# Patient Record
Sex: MALE | Race: WHITE | NOT HISPANIC OR LATINO | Employment: OTHER | URBAN - METROPOLITAN AREA
[De-identification: names, ages, dates, MRNs, and addresses within clinical notes are randomized per-mention and may not be internally consistent; named-entity substitution may affect disease eponyms.]

---

## 2017-06-07 ENCOUNTER — OFFICE VISIT (OUTPATIENT)
Dept: OTOLARYNGOLOGY | Facility: CLINIC | Age: 82
End: 2017-06-07
Payer: MEDICARE

## 2017-06-07 ENCOUNTER — TELEPHONE (OUTPATIENT)
Dept: OTOLARYNGOLOGY | Facility: CLINIC | Age: 82
End: 2017-06-07

## 2017-06-07 VITALS
HEIGHT: 68 IN | HEART RATE: 54 BPM | SYSTOLIC BLOOD PRESSURE: 146 MMHG | BODY MASS INDEX: 22.34 KG/M2 | DIASTOLIC BLOOD PRESSURE: 68 MMHG | WEIGHT: 147.38 LBS | TEMPERATURE: 98 F

## 2017-06-07 DIAGNOSIS — H60.322: ICD-10-CM

## 2017-06-07 DIAGNOSIS — H90.3 SENSORINEURAL HEARING LOSS (SNHL), BILATERAL: ICD-10-CM

## 2017-06-07 DIAGNOSIS — H61.22 IMPACTED CERUMEN OF LEFT EAR: Primary | ICD-10-CM

## 2017-06-07 PROCEDURE — 1159F MED LIST DOCD IN RCRD: CPT | Mod: ,,, | Performed by: OTOLARYNGOLOGY

## 2017-06-07 PROCEDURE — 1126F AMNT PAIN NOTED NONE PRSNT: CPT | Mod: ,,, | Performed by: OTOLARYNGOLOGY

## 2017-06-07 PROCEDURE — 99213 OFFICE O/P EST LOW 20 MIN: CPT | Mod: PBBFAC | Performed by: OTOLARYNGOLOGY

## 2017-06-07 PROCEDURE — 69210 REMOVE IMPACTED EAR WAX UNI: CPT | Mod: S$PBB,,, | Performed by: OTOLARYNGOLOGY

## 2017-06-07 PROCEDURE — 99214 OFFICE O/P EST MOD 30 MIN: CPT | Mod: 25,S$PBB,, | Performed by: OTOLARYNGOLOGY

## 2017-06-07 PROCEDURE — 99999 PR PBB SHADOW E&M-EST. PATIENT-LVL III: CPT | Mod: PBBFAC,,, | Performed by: OTOLARYNGOLOGY

## 2017-06-07 RX ORDER — OFLOXACIN 3 MG/ML
3 SOLUTION AURICULAR (OTIC) 2 TIMES DAILY
Qty: 5 ML | Refills: 0 | Status: SHIPPED | OUTPATIENT
Start: 2017-06-07 | End: 2017-06-17

## 2017-06-07 NOTE — TELEPHONE ENCOUNTER
----- Message from Mira Stack sent at 6/7/2017  3:29 PM CDT -----  Contact: Mira - [facility]  STAT appt request  Dr RABIA MEDINA/[facility] is requesting STAT  appt     [facility] are/cleaning     pts ear is still plugged and now bleeding.     Can you plz see pt today?    Thank you,  Mira Stack   [facility]    [id number]

## 2017-06-07 NOTE — PROCEDURES
Ear Cerumen Removal  Date/Time: 6/7/2017 5:25 PM  Performed by: DANITZA CLARK  Authorized by: DANITZA CLARK     Consent Done?:  Yes (Verbal)    Procedure Note    Diagnosis: left Cerumen Impaction    Description:  The patient was seated in an exam chair.  An ear speculum was placed in the left  EAC and was examined under the microscope.  Suction and/or loop curettes were used to remove a large cerumen impaction.  The tympanic membrane was visualized and was normal in appearance.  The patient tolerated the procedure well without complications.

## 2017-06-07 NOTE — PROGRESS NOTES
Chief Complaint   Patient presents with    Cerumen Impaction     bilateral     Other     [facility] him to be seen by an ENT   .     HPI:  Mr. Jennings was referred today from urgent care for evaluation of bloody otorrhea and possible cerumen impaction. Earlier today he woke up and could not hear out of his left ear which is his only hearing ear with amplification.  [facility] Multiple attempts at irrigating and removing the cerumen was unsuccessful.  The patient left and felt he still could not hear. He later noted bloody otorrhea and went to urgent care. The MD at urgent care could not adequately evaluate the ear due to the bloody otorrhea so he was sent for ENT eval. He notes decreased hearing, left ear pain, and bloody otorrhea. It it the same since onset some hours ago. No interventions thus far have been helpful. Urgent care placed ear drops in attempt to lessen the cerumen and bleeding.     Past Medical History:   Diagnosis Date    Cancer     face    Hearing loss      Social History     Social History    Marital status:      Spouse name: N/A    Number of children: N/A    Years of education: N/A     Occupational History    Not on file.     Social History Main Topics    Smoking status: Never Smoker    Smokeless tobacco: Never Used    Alcohol use No    Drug use: No    Sexual activity: Not on file     Other Topics Concern    Not on file     Social History Narrative    No narrative on file     Past Surgical History:   Procedure Laterality Date    MULTIPLE TOOTH EXTRACTIONS      only sedation in lefe     Family History   Problem Relation Age of Onset    Diabetes Neg Hx     Hypertension Neg Hx     Thyroid disease Neg Hx            Review of Systems  General: negative for chills, fever or weight loss  Psychological: negative for mood changes or depression  Ophthalmic: negative for blurry vision, photophobia or eye pain  ENT: see HPI  Respiratory  ROS: no cough, shortness of breath, or wheezing  Cardiovascular: no chest pain or dyspnea on exertion  Gastrointestinal ROS: no abdominal pain, change in bowel habits, or black/ bloody stools  Musculoskeletal ROS: negative for gait disturbance or muscular weakness  Neurological: no syncope or seizures; no ataxia  Dermatological: negative for puritis,  rash and jaundice  Hematologic/lymphatic: no easy bruising, no new lumps or bumps      Physical Exam:    Vitals:    06/07/17 1631   BP: (!) 146/68   Pulse:    Temp:        Constitutional: Well appearing / communicating without difficutly.  NAD.  Eyes: EOM I Bilaterally  Head/Face: Normocephalic.  Negative paranasal sinus pressure/tenderness.  Salivary glands WNL.  House Brackmann I Bilaterally.    Right Ear: External ear normal and ear canal normal. Hearing decreased to conversation.   Left Ear: External ear normal and ear canal normal occluded. Decreased hearing is noted.   Left complete cerumen impaction, removal described in a separate procedure note.    Nose: No gross nasal septal deviation. Inferior Turbinates +2 bilaterally. No septal perforation. No masses/lesions. External nasal skin appears normal without masses/lesions.  Oral Cavity: Gingiva/lips within normal limits.  Dentition/gingiva healthy appearing. Mucus membranes moist. Floor of mouth soft, no masses palpated. Oral Tongue mobile. Hard Palate appears normal.    Oropharynx: Base of tongue appears normal. No masses/lesions noted. Tonsillar fossa/pharyngeal wall without lesions. Posterior oropharynx WNL.  Soft palate without masses. Midline uvula.   Neck/Lymphatic: No LAD I-VI bilaterally.  No thyromegaly.  No masses noted on exam.    Mirror laryngoscopy/nasopharyngoscopy: Active gag reflex.  Unable to perform.    Neuro/Psychiatric: AOx3.  Normal mood and affect.   Cardiovascular: Normal carotid pulses bilaterally, no increasing jugular venous distention noted at cervical region bilaterally.     Respiratory: Normal respiratory effort, no stridor, no retractions noted.            Assessment:    ICD-10-CM ICD-9-CM    1. Impacted cerumen of left ear H61.22 380.4    2. Hemorrhagic otitis externa of left ear, unspecified chronicity H60.322 380.10    3. Sensorineural hearing loss (SNHL), bilateral H90.3 389.18      The primary encounter diagnosis was Impacted cerumen of left ear. Diagnoses of Hemorrhagic otitis externa of left ear, unspecified chronicity and Sensorineural hearing loss (SNHL), bilateral were also pertinent to this visit.      Plan:  No orders of the defined types were placed in this encounter.      1. Otitis externa: Ofloxacin drops to left ear BID for 10 days. Keep ear dry.  2.   Cerumen impaction:  Removed today without difficulty.  I would recommend the use of a wax softening drop, either over the counter Debrox or mineral oil, on a weekly basis.  I also instructed the patient to avoid Qtips  3. Bilateral SNHL: recommend annual audiograms and continued hearing aid.         Kayla Nowak MD

## 2017-06-07 NOTE — TELEPHONE ENCOUNTER
----- Message from Mira Stack sent at 6/7/2017  3:29 PM CDT -----  Contact: Mira - [facility]  STAT appt request  Dr RABIA MEDINA/[facility] is requesting STAT  appt     [facility]/cleaning     pts ear is still plugged and now bleeding.     Can you plz see pt today?    Thank you,  Mira Stack   [facility]    [id number]

## 2017-06-07 NOTE — PROGRESS NOTES
"  Chief Complaint   Patient presents with    Cerumen Impaction     bilateral     Other     [facility] referred him to be seen by an ENT   .     HPI: Thomas Jennings is 89 y.o. male who is{Referred by:39660} for evaluation of {left/right/bi:02804} ear pain.  Symptoms include {otitis symptoms:327}. Symptoms began {numbers; 0-10:72198} {unit:11} ago and are {course:17::"unchanged"} since that time. Patient denies {respiratory symptoms:98072}. Ear history: {numbers; 0-10:14874} previous ear infections. {HE SHE CAPITAL LETTER:80238} {Actions; denies/reports/admits to:54465} to hearing loss.           Past Medical History:   Diagnosis Date    Cancer     face    Hearing loss      Social History     Social History    Marital status:      Spouse name: N/A    Number of children: N/A    Years of education: N/A     Occupational History    Not on file.     Social History Main Topics    Smoking status: Never Smoker    Smokeless tobacco: Never Used    Alcohol use No    Drug use: No    Sexual activity: Not on file     Other Topics Concern    Not on file     Social History Narrative    No narrative on file     Past Surgical History:   Procedure Laterality Date    MULTIPLE TOOTH EXTRACTIONS      only sedation in lefe     Family History   Problem Relation Age of Onset    Diabetes Neg Hx     Hypertension Neg Hx     Thyroid disease Neg Hx            Review of Systems  General: negative for chills, fever or weight loss  Psychological: negative for mood changes or depression  Ophthalmic: negative for blurry vision, photophobia or eye pain  ENT: see HPI  Respiratory: no cough, shortness of breath, or wheezing  Cardiovascular: no chest pain or dyspnea on exertion  Gastrointestinal: no abdominal pain, change in bowel habits, or black/ bloody stools  Musculoskeletal: negative for gait disturbance or muscular weakness  Neurological: no syncope or seizures; no ataxia  Dermatological: negative " for puritis,  rash and jaundice  Hematologic/lymphatic: no easy bruising, no new lumps or bumps      Physical Exam:    Vitals:    06/07/17 1631   BP: (!) 146/68   Pulse:    Temp:        Constitutional: Well appearing / communicating without difficutly.  NAD.  Eyes: EOM I Bilaterally  Head/Face: Normocephalic.  Negative paranasal sinus pressure/tenderness.  Salivary glands WNL.  House Brackmann I Bilaterally.    Right Ear: Auricle normal appearance. External Auditory Canal within normal limits no lesions or masses,TM w/o masses/lesions/perforations. TM mobility noted.   Left Ear: Auricle normal appearance. External Auditory Canal within normal limits no lesions or masses,TM w/o masses/lesions/perforations. TM mobility noted.  Nose: ***No gross nasal septal deviation. Inferior Turbinates ***3+ bilaterally. No septal perforation. No masses/lesions. External nasal skin appears normal without masses/lesions.  Oral Cavity: Gingiva/lips within normal limits.  Dentition/gingiva healthy appearing. Mucus membranes moist. Floor of mouth soft, no masses palpated. Oral Tongue mobile. Hard Palate appears normal.    Oropharynx: Base of tongue appears normal. No masses/lesions noted. Tonsillar fossa/pharyngeal wall without lesions. Posterior oropharynx WNL.  Soft palate without masses. Midline uvula.   Neck/Lymphatic: No LAD I-VI bilaterally.  No thyromegaly.  No masses noted on exam.    Mirror laryngoscopy/nasopharyngoscopy: Active gag reflex.  Unable to perform.    Neuro/Psychiatric: AOx3.  Normal mood and affect.   Cardiovascular: Normal carotid pulses bilaterally, no increasing jugular venous distention noted at cervical region bilaterally.    Respiratory: Normal respiratory effort, no stridor, no retractions noted.            Assessment:  No diagnosis found.  There were no encounter diagnoses.      Plan:  No orders of the defined types were placed in this encounter.          Kayla Nowak MD

## 2017-06-14 ENCOUNTER — OFFICE VISIT (OUTPATIENT)
Dept: OTOLARYNGOLOGY | Facility: CLINIC | Age: 82
End: 2017-06-14
Payer: MEDICARE

## 2017-06-14 ENCOUNTER — APPOINTMENT (OUTPATIENT)
Dept: LAB | Facility: HOSPITAL | Age: 82
End: 2017-06-14
Attending: OTOLARYNGOLOGY
Payer: MEDICARE

## 2017-06-14 VITALS
HEART RATE: 58 BPM | DIASTOLIC BLOOD PRESSURE: 62 MMHG | SYSTOLIC BLOOD PRESSURE: 115 MMHG | WEIGHT: 143.63 LBS | HEIGHT: 68 IN | BODY MASS INDEX: 21.77 KG/M2 | TEMPERATURE: 98 F

## 2017-06-14 DIAGNOSIS — H90.3 SENSORINEURAL HEARING LOSS (SNHL), BILATERAL: ICD-10-CM

## 2017-06-14 DIAGNOSIS — H60.322: Primary | ICD-10-CM

## 2017-06-14 DIAGNOSIS — H93.90 EAR LESION: ICD-10-CM

## 2017-06-14 PROCEDURE — 99213 OFFICE O/P EST LOW 20 MIN: CPT | Mod: 25,S$PBB,, | Performed by: OTOLARYNGOLOGY

## 2017-06-14 PROCEDURE — 11100 PR BIOPSY OF SKIN LESION: CPT | Mod: S$PBB,,, | Performed by: OTOLARYNGOLOGY

## 2017-06-14 PROCEDURE — 99999 PR PBB SHADOW E&M-EST. PATIENT-LVL III: CPT | Mod: PBBFAC,,, | Performed by: OTOLARYNGOLOGY

## 2017-06-14 PROCEDURE — 88305 TISSUE EXAM BY PATHOLOGIST: CPT | Performed by: PATHOLOGY

## 2017-06-14 PROCEDURE — 11100 PR BIOPSY OF SKIN LESION: CPT | Mod: PBBFAC | Performed by: OTOLARYNGOLOGY

## 2017-06-14 PROCEDURE — 88305 TISSUE EXAM BY PATHOLOGIST: CPT | Mod: 26,,, | Performed by: PATHOLOGY

## 2017-06-14 PROCEDURE — 1126F AMNT PAIN NOTED NONE PRSNT: CPT | Mod: S$PBB,,, | Performed by: OTOLARYNGOLOGY

## 2017-06-14 PROCEDURE — 99213 OFFICE O/P EST LOW 20 MIN: CPT | Mod: PBBFAC | Performed by: OTOLARYNGOLOGY

## 2017-06-14 PROCEDURE — 1159F MED LIST DOCD IN RCRD: CPT | Mod: S$PBB,,, | Performed by: OTOLARYNGOLOGY

## 2017-06-14 NOTE — PROGRESS NOTES
Chief Complaint   Patient presents with    Follow-up   .     HPI:  Mr. Jennings was referred today from urgent care for evaluation of bloody otorrhea and possible cerumen impaction. Earlier today he woke up and could not hear out of his left ear which is his only hearing ear with amplification.  [facility] Multiple attempts at irrigating and removing the cerumen was unsuccessful.  The patient left and felt he still could not hear. He later noted bloody otorrhea and went to urgent care. The MD at urgent care could not adequately evaluate the ear due to the bloody otorrhea so he was sent for ENT eval. He notes decreased hearing, left ear pain, and bloody otorrhea. It it the same since onset some hours ago. No interventions thus far have been helpful. Urgent care placed ear drops in attempt to lessen the cerumen and bleeding.     Interval history: Mr. Jennings reports that his ear is improved. He has no further bloody otorrhea or otalgia.  He feels the ear drops have helped. He is able to use his hearing aids now and feels his hearing is at his baseline. He reports a chronically bleeding sore on his left ear that has been bothering him. He has a history of prior WLE of BCCA in this region 2 years ago.       Past Medical History:   Diagnosis Date    Cancer     face    Hearing loss      Social History     Social History    Marital status:      Spouse name: N/A    Number of children: N/A    Years of education: N/A     Occupational History    Not on file.     Social History Main Topics    Smoking status: Never Smoker    Smokeless tobacco: Never Used    Alcohol use No    Drug use: No    Sexual activity: Not on file     Other Topics Concern    Not on file     Social History Narrative    No narrative on file     Past Surgical History:   Procedure Laterality Date    MULTIPLE TOOTH EXTRACTIONS      only sedation in lefe     Family History   Problem Relation Age of Onset    Diabetes Neg Hx      Hypertension Neg Hx     Thyroid disease Neg Hx            Review of Systems  General: negative for chills, fever or weight loss  Psychological: negative for mood changes or depression  Ophthalmic: negative for blurry vision, photophobia or eye pain  ENT: see HPI  Respiratory ROS: no cough, shortness of breath, or wheezing  Cardiovascular: no chest pain or dyspnea on exertion  Gastrointestinal ROS: no abdominal pain, change in bowel habits, or black/ bloody stools  Musculoskeletal ROS: negative for gait disturbance or muscular weakness  Neurological: no syncope or seizures; no ataxia  Dermatological: negative for puritis,  rash and jaundice  Hematologic/lymphatic: no easy bruising, no new lumps or bumps      Physical Exam:    Vitals:    06/14/17 1453   BP: 115/62   Pulse: (!) 58   Temp: 98.1 °F (36.7 °C)       Constitutional: Well appearing / communicating without difficutly.  NAD.  Eyes: EOM I Bilaterally  Head/Face: Normocephalic.  Negative paranasal sinus pressure/tenderness.  Salivary glands WNL.  House Brackmann I Bilaterally.    Right Ear: External ear normal and ear canal normal. Hearing decreased to conversation. TM intact; no effusion; normal mobility.  Left Ear: Ear canal improved. Decreased hearing is noted TM intact; no effusion; normal mobility. Ulcerative lesion of the external auricle which is ulcerative  erythematous immediately superior to the tragus.      Nose: No gross nasal septal deviation. Inferior Turbinates +2 bilaterally. No septal perforation. No masses/lesions. External nasal skin appears normal without masses/lesions.  Oral Cavity: Gingiva/lips within normal limits.  Dentition/gingiva healthy appearing. Mucus membranes moist. Floor of mouth soft, no masses palpated. Oral Tongue mobile. Hard Palate appears normal.    Oropharynx: Base of tongue appears normal. No masses/lesions noted. Tonsillar fossa/pharyngeal wall without lesions. Posterior oropharynx WNL.  Soft palate without masses.  Midline uvula.   Neck/Lymphatic: No LAD I-VI bilaterally.  No thyromegaly.  No masses noted on exam.    Mirror laryngoscopy/nasopharyngoscopy: Active gag reflex.  Unable to perform.    Neuro/Psychiatric: AOx3.  Normal mood and affect.   Cardiovascular: Normal carotid pulses bilaterally, no increasing jugular venous distention noted at cervical region bilaterally.    Respiratory: Normal respiratory effort, no stridor, no retractions noted.      See separate procedure note      Assessment:    ICD-10-CM ICD-9-CM    1. Hemorrhagic otitis externa of left ear, unspecified chronicity H60.322 380.10    2. Ear lesion H61.899 380.89 Tissue Specimen To Pathology, Ent   3. Sensorineural hearing loss (SNHL), bilateral H90.3 389.18      The primary encounter diagnosis was Hemorrhagic otitis externa of left ear, unspecified chronicity. Diagnoses of Ear lesion and Sensorineural hearing loss (SNHL), bilateral were also pertinent to this visit.      Plan:    1. Otitis externa: Resolved.  2. Bilateral SNHL: recommend annual audiograms and continued use of hearing aids.   3. Left auricular lesion: Punch biopsy performed today. Further recommendations to follow when pathology results are available.         Kayla Nowak MD

## 2017-06-19 NOTE — PROCEDURES
Procedures     PROCEDURE NOTE:  Punch biopsy of Left auricular skin lesion  Preprocedure diagnosis:  Left auricular lesion  Postprocedure diangosis:  Same  Complications:  None  Blood Loss:  Minimal    Procedure in detail:  After written consent was obtained, approximately 1cc of 1% lidocaine with epinepherine was injected into the area of the lesion.  Using a 3mm punch biopsy, a tissue specimen was obtained from the border of the lesion and placed in a sterile container for pathologic analysis.  A small amount of ointment was applied to the area.  The patient tolerated the procedure well and there were no significant complications.

## 2017-06-27 ENCOUNTER — TELEPHONE (OUTPATIENT)
Dept: OTOLARYNGOLOGY | Facility: CLINIC | Age: 82
End: 2017-06-27

## 2017-06-27 NOTE — TELEPHONE ENCOUNTER
Please call the patient or patient's daughter to inform her that the biopsy of the ear was consistent with the type of cancer he had previously in that area- basal cell carcinoma. We will have to schedule him for removal in the OR.

## 2017-06-28 NOTE — TELEPHONE ENCOUNTER
Spoke with patient, notified that Dr Pool would have to remove the carcinoma on his ear. Told patient he would need to get cleared by his PCP first before surgery could be performed. Patient reports he does not have a PCP but he will find one to get the surgery clearance. Told patient to call back once he found one and to call if he had any more questions, pt verbalized understanding.

## 2017-07-18 ENCOUNTER — TELEPHONE (OUTPATIENT)
Dept: OTOLARYNGOLOGY | Facility: CLINIC | Age: 82
End: 2017-07-18

## 2017-07-18 NOTE — TELEPHONE ENCOUNTER
----- Message from Kayla Nowak MD sent at 7/18/2017 10:40 AM CDT -----  Natalie,  Can you please call this patient back and also bring his daughter into the conversation? He has a skin cancer on his ear that needs to be removed. I don't want this patient to get lost to follow up and end up with a bad outcome. I think his daughter helps coordinate his care.   Thanks,  MT

## 2017-07-18 NOTE — TELEPHONE ENCOUNTER
Attempted to call patient and his daughter to discuss scheduling a day for surgery. Left voicemail to return my call

## 2017-07-18 NOTE — TELEPHONE ENCOUNTER
Attempted to call Alma Rosa to discuss patient results and to talk about scheduling him in the OR to remove carcinoma on the left ear. No answer, left voicemail to return my call

## 2017-07-21 ENCOUNTER — TELEPHONE (OUTPATIENT)
Dept: OTOLARYNGOLOGY | Facility: CLINIC | Age: 82
End: 2017-07-21

## 2017-07-21 NOTE — TELEPHONE ENCOUNTER
Attempted to call Al[facility] Rosa to discuss scheduling Thomas in the OR. No answer, left voicemail to return my call.

## 2017-09-06 ENCOUNTER — OFFICE VISIT (OUTPATIENT)
Dept: OTOLARYNGOLOGY | Facility: CLINIC | Age: 82
End: 2017-09-06
Payer: MEDICARE

## 2017-09-06 VITALS
WEIGHT: 142.19 LBS | BODY MASS INDEX: 21.55 KG/M2 | HEIGHT: 68 IN | HEART RATE: 68 BPM | DIASTOLIC BLOOD PRESSURE: 70 MMHG | SYSTOLIC BLOOD PRESSURE: 140 MMHG

## 2017-09-06 DIAGNOSIS — C44.219 BASAL CELL CARCINOMA, EAR, LEFT: Primary | ICD-10-CM

## 2017-09-06 DIAGNOSIS — D49.0: ICD-10-CM

## 2017-09-06 PROCEDURE — 1126F AMNT PAIN NOTED NONE PRSNT: CPT | Mod: ,,, | Performed by: OTOLARYNGOLOGY

## 2017-09-06 PROCEDURE — 99215 OFFICE O/P EST HI 40 MIN: CPT | Mod: PBBFAC,PO | Performed by: OTOLARYNGOLOGY

## 2017-09-06 PROCEDURE — 11100 PR BIOPSY OF SKIN LESION: CPT | Mod: PBBFAC,PO | Performed by: OTOLARYNGOLOGY

## 2017-09-06 PROCEDURE — 88305 TISSUE EXAM BY PATHOLOGIST: CPT | Performed by: PATHOLOGY

## 2017-09-06 PROCEDURE — 1159F MED LIST DOCD IN RCRD: CPT | Mod: ,,, | Performed by: OTOLARYNGOLOGY

## 2017-09-06 PROCEDURE — 99214 OFFICE O/P EST MOD 30 MIN: CPT | Mod: 25,S$PBB,, | Performed by: OTOLARYNGOLOGY

## 2017-09-06 PROCEDURE — 99999 PR PBB SHADOW E&M-EST. PATIENT-LVL V: CPT | Mod: PBBFAC,,, | Performed by: OTOLARYNGOLOGY

## 2017-09-06 PROCEDURE — 11100 PR BIOPSY OF SKIN LESION: CPT | Mod: S$PBB,,, | Performed by: OTOLARYNGOLOGY

## 2017-09-06 NOTE — PROGRESS NOTES
Subjective:       Patient ID: Thomas Jennings is a 89 y.o. male.    Chief Complaint: Basal Cell Carcinoma (left ear)    Thomas is here for follow-up of history of left BCCa. He has a history of left ear BCCa removed a few years ago. Margins ultimately negative, local tissue closure. Reports recurrence of his lesion 4-5 months ago on junction of helical root and tragus.  It occasionally bleeds.  This was biopsied as recurrent basal cell carcinoma.  He also has a enlarging lesion over the past year inferior to his left nasal labial fold.  This has not been biopsied.  Other than his age he has no medical issues and takes no medications.  Denies any bleeding disorders. Does not wear glasses.    Review of Systems   Constitutional: Negative for activity change and appetite change.   Respiratory: Negative for difficulty breathing and wheezing   Cardiovascular: Negative for chest pain.      Objective:        Constitutional:   Vital signs are normal. He appears well-developed and well-nourished.     Head:  Normocephalic and atraumatic.     Ears:    Right Ear: No swelling. Tympanic membrane mobility is normal. No decreased hearing is noted.   Left Ear: No swelling. Tympanic membrane mobility is normal. No decreased hearing is noted.   1.2 cm ulcerative lesion at helical root and superior tragus with some extension to the cymba angel luis.         Nose:  No mucosal edema or rhinorrhea.                     Tests / Results:  I personally reviewed the biopsy performed of the left helix and my findings reveal: Recurrent basal cell carcinoma present at the inked margins    Thomas Jennings  [id number]  7/30/1922    Diagnosis: Lesion of left nasolabial fold, upper lip    Procedure: Punch biopsy of left upper lip lesion     Risks were discussed including scar, hematoma, seroma, recurrence/persistent, need for further procedures. ,he signed consent. A time out was performed with the clinic nurse present.     Procedure in detail: The area was  visualized with proper lighting and exposure.  Adequate anesthesia was obtained using 0.4 cc of 1% Lidocaine with 1:100,000 Epinephrine. A 2 mm punch biopsy was used to incise through the lesion at the border. A full thickness biopsy was taken carefully and sent for permanent pathology. The specimen was passed off for pathologic analysis.     The wound was closed with 5-0 fast gut. Ointment was applied.     The patient tolerated the procedure well with no complications.     Assessment:       1. Basal cell carcinoma, ear, left    2. Neoplasm of upper lip          Plan:       I discussed with him the need for excision of the recurrent left ear basal cell carcinoma.  This would involve excision with possible skin grafting or local reconstruction.  I also discussed the possibility of a delayed reconstruction depending on operative findings.  He wears a hearing aid on this side and I would try my best to preserve his contralateral ridge and some of the tragus although he knows that this may affect his hearing aid.    Has a lesion on nasolabial crease on left I am concerned about - as was biopsied today.  If this is positive for basal cell would recommend Mohs excision.  He is seen then they could also consider excising the left ear lesion under Mohs although I'm happy to do this.  I could then perform reconstruction of either or both lesions as needed.  I will call him with the results.

## 2017-09-06 NOTE — Clinical Note
I saw this patient today and he has a left helical recurrent BCCA that needs excised. I am happy to excise this and reconstruct, but I may be sending him to you for evaluation of what I suspect is a BCCa of the inferior left nasolabial region / upper lip. He is 89 but in good health. If you agreed with Moh's of the lip (and even I'm fine with Moh's of the ear if you feel it's indicated) I can reconstruct or just have you take care of him - but I'm happy to do whatever for him - just trying to consolidate his care a bit. I can be in touch if his biopsy is positive.

## 2017-09-11 ENCOUNTER — TELEPHONE (OUTPATIENT)
Dept: OTOLARYNGOLOGY | Facility: CLINIC | Age: 82
End: 2017-09-11

## 2017-09-11 NOTE — TELEPHONE ENCOUNTER
Called patient to discuss path, no answer. LM . Will arrange for Moh's evaluation to see if candidate.

## 2017-09-12 ENCOUNTER — TELEPHONE (OUTPATIENT)
Dept: OTOLARYNGOLOGY | Facility: CLINIC | Age: 82
End: 2017-09-12

## 2017-09-12 ENCOUNTER — TELEPHONE (OUTPATIENT)
Dept: DERMATOLOGY | Facility: CLINIC | Age: 82
End: 2017-09-12

## 2017-09-12 NOTE — TELEPHONE ENCOUNTER
----- Message from Sherwin Alatorre MD sent at 9/11/2017  4:42 PM CDT -----  Biopsy positive for basal cell carcinoma. Due to location, would consider evaluation for Moh's microsurgery. Can you make an appointment for him to see Dr. Reyes for evaluation or this lesion as well as existing ear carcinoma? I can excise or reconstruct either/both, but if Moh's is better option for him, would see what can be done.

## 2017-09-13 ENCOUNTER — TELEPHONE (OUTPATIENT)
Dept: DERMATOLOGY | Facility: CLINIC | Age: 82
End: 2017-09-13

## 2017-09-13 ENCOUNTER — INITIAL CONSULT (OUTPATIENT)
Dept: DERMATOLOGY | Facility: CLINIC | Age: 82
End: 2017-09-13
Payer: MEDICARE

## 2017-09-13 VITALS
SYSTOLIC BLOOD PRESSURE: 137 MMHG | BODY MASS INDEX: 20.46 KG/M2 | WEIGHT: 135 LBS | HEIGHT: 68 IN | HEART RATE: 73 BPM | DIASTOLIC BLOOD PRESSURE: 70 MMHG

## 2017-09-13 DIAGNOSIS — C44.219 BASAL CELL CARCINOMA OF EAR, LEFT: Primary | ICD-10-CM

## 2017-09-13 DIAGNOSIS — D48.5 NEOPLASM OF UNCERTAIN BEHAVIOR OF SKIN: ICD-10-CM

## 2017-09-13 DIAGNOSIS — C44.01 BASAL CELL CARCINOMA, LIP: ICD-10-CM

## 2017-09-13 PROCEDURE — 99999 PR PBB SHADOW E&M-EST. PATIENT-LVL III: CPT | Mod: PBBFAC,,, | Performed by: DERMATOLOGY

## 2017-09-13 PROCEDURE — 1159F MED LIST DOCD IN RCRD: CPT | Mod: ,,, | Performed by: DERMATOLOGY

## 2017-09-13 PROCEDURE — 1126F AMNT PAIN NOTED NONE PRSNT: CPT | Mod: ,,, | Performed by: DERMATOLOGY

## 2017-09-13 PROCEDURE — 99202 OFFICE O/P NEW SF 15 MIN: CPT | Mod: S$PBB,,, | Performed by: DERMATOLOGY

## 2017-09-13 PROCEDURE — 99213 OFFICE O/P EST LOW 20 MIN: CPT | Mod: PBBFAC,PO | Performed by: DERMATOLOGY

## 2017-09-13 NOTE — PROGRESS NOTES
ALLERGIES:  Review of patient's allergies indicates no known allergies.    CHIEF COMPLAINT:  This 89 y.o. male comes for evaluation for Mohs' Micrographic Surgery, Fresh Tissue Technique, for treatment of a biopsy-proven basal cell carcinoma on the nasolabial fold. Consultation requested by Sherwin Alatorre M.D..    HISTORY OF PRESENT ILLNESS:   Location: nasolabial fold  Duration: 2 weeks  or more  Quality: persistent  Context: status post biopsy by Sherwin Alatorre M.D.; path = basal cell carcinoma; pathology accession #[id number], [facility]     Prior Treatment: apparently had surgery to the left ear site by Dr. Davila in 2015  See also the handwritten notes/diagrams scanned to chart for additional details.    Defibrillator: No  Pacemaker: No  Artificial heart valves: No  Artificial joints: No    REVIEW OF SYSTEMS:   General: general health good  Skin: has previous history of skin cancer(s) as above; has another bump left temple  CV: has no hypertension, no artificial valves, has no chest pain  Resp: has no shortness of breath  Endo: has no diabetes  Hem/Lymph: not taking prescribed anticoagulants, has no easy bruising/bleeding  Allergy/Immuno: has no allergies as noted above  GI: has no history of hepatitis  MS: as noted above     PAST MEDICAL HISTORY:  Past Medical History:   Diagnosis Date    Cancer     face    Hearing loss        PAST SURGICAL HISTORY:  Past Surgical History:   Procedure Laterality Date    MULTIPLE TOOTH EXTRACTIONS      only sedation in lefe        SOCIAL HISTORY:  Dependencies: smoking status as noted below  Social History   Substance Use Topics    Smoking status: Never Smoker    Smokeless tobacco: Never Used    Alcohol use No       PERTINENT MEDICATIONS:  See medications list.    No current outpatient prescriptions on file prior to visit.     No current facility-administered medications on file prior to visit.      ALLERGIES:  Review of patient's allergies indicates no known  allergies.    EXAM:  See also the handwritten notes/diagrams scanned to chart for additional details.  Constitutional  General appearance: well-developed, well-nourished, well-kempt older white male    Eyes  Inspection of conjunctivae and lids reveals no abnormalities; sclerae anicteric  Neurologic/Psychiatric  Alert,  normal orientation to time, place, person  Normal mood and affect with no evidence of depression, anxiety, agitation  Skin: see photo(s)  Head: background marked solar damage to exposed areas of skin; in addition, inspection/palpation reveals an approximately 1.5-2 cm area of pearly/telangiectatic and crusted plaque on the left ear superior helical insertion and adjacent skin and an approximately 1.5 cm pearly/telangiectatic tumor on the left upper lip; he confirmed these as the sites of the prior biopsies; in addition, there is an approximately 5 mm papule on the left temple anterior temple which is clinically suggestive of a small squamous cell carcinoma  Neck: examination reveals marked chronic solar damage  Right upper extremity: examination reveals marked chronic solar damage; some ecchymosis/ecchymoses   Left upper extremity: examination reveals marked chronic solar damage; some ecchymosis/ecchymoses     ASSESSMENT: 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 In particular, the discussion included, but was not limited to, the following:    One  alternative at this point would be to defer further treatment and observe the lesions. With small skin cancers of this kind, it is possible that a biopsy can be sufficient to definitively treat a small skin cancer of this kind. Alternatively, some skin cancers are slow growing and do not require immediate treatment. The potential advantage of this choice would be to avoid the need for possibly unnecessary additional surgery. Among the potential disadvantages of this would be the possibility of enlargement of the lesions, more extensive spread of the lesions or recurrence at a later date, which might necessitate larger and more complex surgeries.    Radiation treatment can be an effective treatment for these types of skin cancer. The usual course of treatment is every weekday for several weeks. Local irritation will result from treatment, although no systemic side effects are expected. The potential advantage of radiation treatment is that it avoids the need for surgery. Among the disadvantages of radiation treatment are the length of treatment, the local inflammatory response, the absence of pathologic confirmation of the removal of the skin cancer, a possible increased risk of additional skin cancer in the treated area in later years, and a somewhat increased risk of recurrence at a later date.     Excisional surgery can be an effective treatment for these types of skin cancer. This would involve excision of the lesions with margin evaluation by submitting the specimens to a pathologist for either immediate marginal assessment via frozen section processing, or delayed marginal assessment by fixed-tissue processing. The potential advantage of this technique is that it offers a way of treating the lesions with some degree of histologic confirmation of tumor removal. Among the disadvantages of this treatment are the possible need for re-excision if marginal involvement is identified, a somewhat greater likelihood of  recurrence as compared to Mohs' surgery because of the less comprehensive margin evaluation inherent in the technique, and the general potential risks of surgery, including allergic reactions to the anesthetic and other materials used, infection, injury to nerves in the area with consequent loss of sensation or muscle function, and scarring or distortion of surrounding structures.    Mohs' surgery is a very effective treatment for these types of skin cancer. The potential advantage of Mohs' surgery is that this technique offers the greatest possible certainty of knowing that the skin cancer has been completely removed, with the removal of the least amount of normal tissue. The potential disadvantages of Mohs' surgery include the duration of the surgery, the possible need for a separate surgery for reconstruction following tumor removal, and scarring as a result. In addition, general potential risks of surgery as noted above also apply to treatment via Mohs' surgery.    In light of the nature of these tumors and the locations in areas of increased risk of recurrence, Mohs' micrographic surgery was thought to be the most appropriate management choice, and these diagnoses are appropriate for treatment by Mohs' micrographic surgery.     We also discussed options for repair of the sites to follow tumor removal via Mohs' surgery, including the participation of a plastic surgeon to reconstruct the resultant wound. Given the locations, the anticipated sizes and potential complexity of the defects to follow tumor removal via Mohs' surgery, reconstruction will be coordinated with Dr. Alatorre.      We also discussed the lesion on the left temple/lateral canthus. I spoke to Dr. Alatorre while Mr. Jennings was here, and he will proceed with excision of this lesion at the time of repair of the left ear and left upper lip sites.    Sufficient time was available for questions, and all questions were answered to his satisfaction.  He fully understands the aims, risks, alternatives, and possible complications, and has elected to proceed with the surgery, and verbally consented to do so. The procedure will be scheduled in the near future.    Routine pre-op instructions were given to him.  --------------------------------------  Note: Some or all of this note may have been generated using voice recognition software. There may be voice recognition errors including grammatical and/or spelling errors found in the text. Attempts were made to correct these errors prior to signature.

## 2017-09-13 NOTE — LETTER
September 14, 2017      Sherwin Alatorre MD  [address] [facility] [address] [place] [address]           [facility]  [address] [facility] [address] [place] [address]  Phone: [phone]          Patient: Thomas Jennings   MR Number: [id number]   YOB: 1922   Date of Visit: 9/13/2017       Dear Dr. Sherwin Alatorre:    Thank you for referring Thomas Jennings to me for evaluation. Attached you will find relevant portions of my assessment and plan of care.    If you have questions, please do not hesitate to call me. I look forward to following Thomas Jennings along with you.    Sincerely,    Adrian Reyes MD    Enclosure  CC:  No Recipients    If you would like to receive this communication electronically, please contact [email or web address] or [phone] to request more information on [facility] [facility] access.    For providers and/or their staff who would like to refer a patient to [facility], please contact us through our one-stop-shop provider referral line, [facility] , at [phone].    If you feel you have received this communication in error or would no longer like to receive these types of communications, please e-mail [email or web address]

## 2017-11-01 ENCOUNTER — PROCEDURE VISIT (OUTPATIENT)
Dept: DERMATOLOGY | Facility: CLINIC | Age: 82
End: 2017-11-01
Payer: MEDICARE

## 2017-11-01 VITALS
WEIGHT: 132 LBS | SYSTOLIC BLOOD PRESSURE: 141 MMHG | DIASTOLIC BLOOD PRESSURE: 70 MMHG | HEIGHT: 68 IN | HEART RATE: 68 BPM | BODY MASS INDEX: 20 KG/M2

## 2017-11-01 DIAGNOSIS — D48.5 NEOPLASM OF UNCERTAIN BEHAVIOR OF SKIN: Primary | ICD-10-CM

## 2017-11-01 PROCEDURE — 11100 PR BIOPSY OF SKIN LESION: CPT | Mod: S$PBB,,, | Performed by: DERMATOLOGY

## 2017-11-01 PROCEDURE — 11101 PR BIOPSY, EACH ADDED LESION: CPT | Mod: S$PBB,,, | Performed by: DERMATOLOGY

## 2017-11-01 PROCEDURE — 11100 PR BIOPSY OF SKIN LESION: CPT | Mod: PBBFAC,PO | Performed by: DERMATOLOGY

## 2017-11-01 PROCEDURE — 11101 PR BIOPSY, EACH ADDED LESION: CPT | Mod: PBBFAC,PO | Performed by: DERMATOLOGY

## 2017-11-01 PROCEDURE — 88305 TISSUE EXAM BY PATHOLOGIST: CPT | Performed by: PATHOLOGY

## 2017-11-01 PROCEDURE — 99499 UNLISTED E&M SERVICE: CPT | Mod: S$PBB,,, | Performed by: DERMATOLOGY

## 2017-11-01 NOTE — PROGRESS NOTES
Allergies: Review of patient's allergies indicates:  No Known Allergies    Chief Complaint: here for Mohs' surgery to basal cell carcinomas on the left ear and left upper lip    HPI:   Location: left ear, left upper lip  Quality: unchanged  Timing: I last saw him 6-7 weeks ago  Context: prior biopsy showed basal cell carcinoma to these two sites; he is scheduled for Mohs' surgery to the two sites with me today, and for repair by Dr. Alatorre on Friday (in two days); however, he is under the impression that he is to have both the Mohs' surgery and the repairs performed today; he is not sure if he will be able to have someone drive him on Friday for the repairs    Review of Systems:  Skin: has two other spots he notes on the left cheek/lower eyelid and cheek/lateral canthus; the lateral lesion was to be excised by Dr. Alatorre on Friday at the time of the repairs of the other two sites    Medications: see list  No current outpatient prescriptions on file.    Review of patient's allergies indicates:  No Known Allergies    Past Medical History:   Diagnosis Date    Cancer     face    Hearing loss        Past Surgical History:   Procedure Laterality Date    MULTIPLE TOOTH EXTRACTIONS      only sedation in lefe       Social History     Social History    Marital status:      Spouse name: N/A    Number of children: N/A    Years of education: N/A     Occupational History    Not on file.     Social History Main Topics    Smoking status: Never Smoker    Smokeless tobacco: Never Used    Alcohol use No    Drug use: No    Sexual activity: Not on file     Other Topics Concern    Not on file     Social History Narrative    No narrative on file       Vitals:    11/01/17 0750   BP: (!) 141/70   Pulse: 68       EXAM:  Constitutional  - General appearance: well-developed, well-nourished, well-kempt elderly white male    Eyes  - Conjunctivae and lids - no abnormalities; sclerae anicteric  Neurologic/Psychiatric  -  Orientation - Alert,  normal orientation to time, place, person  - Mood and affect - Normal mood and affect with no evidence of depression, anxiety, agitation; speech is normal  Skin: (see photo)  - Head/Face: the pearly tumor on the left upper lip is unchanged, as is the ear site; on his left medial cheek/lower eyelid there is an approximately 9-10 mm area of erythema and irregular hyperkeratosis which is clinically suggestive of an superficially invasive squamous cell carcinoma; on his left lateral cheek/lateral canthus there is an approximately 1.5 x 2.5 cm area of erythema and irregular hyperkeratosis, at the superior end of which is an approximately 6-7 mm hyperkeratotic nodule    Assessment:  basal cell carcinoma of the left upper lip, pending treatment  basal cell carcinoma of the left ear, pending treatment  squamous cell carcinoma vs other, left medial cheek/lower eyelid  squamous cell carcinoma vs other, left lateral cheek/lateral canthus  Plan:  I discussed with the patient the diagnoses and management options, and risks, benefits and alternatives. This discussion included but was not limited to the following:    We discussed the scheduled procedures, including his Mohs' surgery today and the repairs on Friday. I explained that one option, since he is not sure if he would have transportation on Friday, and would prefer to have the Mohs and repair done the same day if possible, would be to reschedule his surgery; we can probably arrange to have his surgeries done in this manner. I discussed it with Dr. Alatorre who agrees.    I reviewed with him the nature of the skin cancer(s) on his lip and left ear. I explained that this is not an issue which requires immediate/urgent treatment.    After discussing the options, we will defer his surgery for the present.    We also discussed the lesions on the left medial cheek/lower eyelid and left lateral cheek/lateral canthus. In light of suspicious nature of these  lesions, we will proceed with biopsies of these two sites. See the procedure notes below.    All questions were answered to his satisfaction.    We can coordinate his further treatment once biopsy results become available.    See the procedure note below.      PROCEDURE NOTE: MULTIPLE SHAVE BIOPSIES    The diagnoses and management options and risk, benefits and alternatives were discussed with the patient. All questions were answered. Verbal consent was obtained.    SITE ONE  Location: left medial cheek  Indication: clinically suspicious lesion; rule out malignancy  Prep: ethanol  Anesthesia: 1% lidocaine plain, less than 2 mL  Shave biopsy performed  Hemostasis with electrodesiccation  Dressed with petrolatum and bandaid  Specimen placed in formalin to be submitted to pathology    SITE TWO  Location: left lateral cheek  Indication: clinically suspicious lesion; rule out malignancy  Prep: as above  Anesthesia: as above  Shave biopsy performed  Hemostasis with electrodesiccation  Dressed with petrolatum and bandaid  Specimen placed in formalin to be submitted to pathology    Routine care instructions given  Followup: per pathology; will call when path becomes available and coordinate further treatment

## 2017-11-14 ENCOUNTER — TELEPHONE (OUTPATIENT)
Dept: DERMATOLOGY | Facility: CLINIC | Age: 82
End: 2017-11-14

## 2017-11-14 NOTE — TELEPHONE ENCOUNTER
spoke to patient can resheduled MOHS appt. But will have to confirm with Dr. Braswell's office when Mallika Bennett is back.

## 2017-11-14 NOTE — TELEPHONE ENCOUNTER
Spoke to patient after coordinating surgeries with Sherwin Garcia's office. Dr. Reyes will preform the MOHS on 1-3-18 and Dr. Braswell will preform the repair on 1-5-18. I explained to patient that he will have to have a  for his repair 1-5-18. Also informed patient Phong's office will contact him with further instructions.

## 2017-12-05 ENCOUNTER — TELEPHONE (OUTPATIENT)
Dept: DERMATOLOGY | Facility: CLINIC | Age: 82
End: 2017-12-05

## 2017-12-05 NOTE — TELEPHONE ENCOUNTER
LM had to rechedule MOHS appt because dr. reyes is out of town. already coordinated new surgery dates with Dr. Sherwin Braswell. Waiting to return phone call from patient to make sure he understands new dates of 1-17-18 for MOHS with Dr. Reyes and repair with Dr. Sherwin Braswell 1-19-18

## 2017-12-21 ENCOUNTER — TELEPHONE (OUTPATIENT)
Dept: INFECTIOUS DISEASES | Facility: CLINIC | Age: 82
End: 2017-12-21

## 2017-12-21 NOTE — TELEPHONE ENCOUNTER
Pt contacted with instructions for 17 th & 19th . ----- Message from Jani Greer sent at 12/21/2017  1:18 PM CST -----  Contact: patient's wife   Patient's wife called in requesting to speak with dr. arora or dr. arora's nurse.  patient's wife stated that she has questions to ask before his procedure. Contact number is [phone]. Thank You.

## 2018-01-05 ENCOUNTER — TELEPHONE (OUTPATIENT)
Dept: OTOLARYNGOLOGY | Facility: CLINIC | Age: 83
End: 2018-01-05

## 2018-01-05 NOTE — TELEPHONE ENCOUNTER
----- Message from Yamile Sepulveda sent at 1/5/2018  9:39 AM CST -----  Contact: Friend  Ne Ornelas [phone] friend, requesting same day appointment, states patient has wax build up and cannot wait until next available which is 1/16/18. Please advise. Thanks.

## 2018-01-05 NOTE — TELEPHONE ENCOUNTER
Spoke with friend, Ne, that states the pt has wax and is having trouble hearing. I offered an appt with Cassandra Dennis next week for ear cleaning. Pt declined and states he will go to urgent care today.

## 2018-01-16 ENCOUNTER — TELEPHONE (OUTPATIENT)
Dept: DERMATOLOGY | Facility: CLINIC | Age: 83
End: 2018-01-16

## 2018-01-16 NOTE — TELEPHONE ENCOUNTER
1-16-18 Called both numbers in chart left amessage for patients to call us . We need to cancel his surgery appointment.

## 2018-01-18 ENCOUNTER — TELEPHONE (OUTPATIENT)
Dept: DERMATOLOGY | Facility: CLINIC | Age: 83
End: 2018-01-18

## 2018-01-18 NOTE — TELEPHONE ENCOUNTER
1-18-18 Called patient to give him his new surgery dates, 2-21-18 with Dr. Reyes and 2-23-18 with Dr. Morales

## 2018-02-21 ENCOUNTER — PROCEDURE VISIT (OUTPATIENT)
Dept: DERMATOLOGY | Facility: CLINIC | Age: 83
End: 2018-02-21
Payer: MEDICARE

## 2018-02-21 VITALS
DIASTOLIC BLOOD PRESSURE: 76 MMHG | WEIGHT: 132 LBS | HEART RATE: 65 BPM | HEIGHT: 68 IN | SYSTOLIC BLOOD PRESSURE: 153 MMHG | BODY MASS INDEX: 20 KG/M2

## 2018-02-21 DIAGNOSIS — C44.01 BASAL CELL CARCINOMA, LIP: Primary | ICD-10-CM

## 2018-02-21 DIAGNOSIS — C44.219 BASAL CELL CARCINOMA, EAR, LEFT: ICD-10-CM

## 2018-02-21 PROCEDURE — 17311 MOHS 1 STAGE H/N/HF/G: CPT | Mod: 59,PBBFAC,PO | Performed by: DERMATOLOGY

## 2018-02-21 PROCEDURE — 17311 MOHS 1 STAGE H/N/HF/G: CPT | Mod: S$PBB,,, | Performed by: DERMATOLOGY

## 2018-02-21 PROCEDURE — 17312 MOHS ADDL STAGE: CPT | Mod: PBBFAC,PO | Performed by: DERMATOLOGY

## 2018-02-21 PROCEDURE — 17312 MOHS ADDL STAGE: CPT | Mod: S$PBB,,, | Performed by: DERMATOLOGY

## 2018-02-21 PROCEDURE — 99499 UNLISTED E&M SERVICE: CPT | Mod: S$PBB,,, | Performed by: DERMATOLOGY

## 2018-02-21 NOTE — PROGRESS NOTES
ALLERGIES:    Patient has no known allergies.    No current outpatient prescriptions on file.  -------------------------------------------------------------  PROCEDURE: Mohs' Micrographic Surgery to two sites    FIRST SITE: left upper lip    INDICATION: basal cell carcinoma, in an area at increased risk of recurrence    CASE NUMBER: [id number]      ANESTHETIC: 6 mL 2% Lidocaine with Epinephrine 1:100,000    SURGICAL PREP: Ethanol and Betadine    SURGEON: Adrian Reyes MD    ASSISTANTS: Juliette Rodrigues CST     PREOPERATIVE DIAGNOSIS: basal cell carcinoma     POSTOPERATIVE DIAGNOSIS: basal cell carcinoma     PATHOLOGIC DIAGNOSIS: basal cell carcinoma     STAGES OF MOHS' SURGERY PERFORMED: one    TUMOR-FREE PLANE ACHIEVED: yes    HEMOSTASIS: Hyfrecation     SPECIMENS: one (one in stage A)    INITIAL LESION SIZE: 1.2 x 1.5 cm    FINAL DEFECT SIZE: 1.1 x 1.6 cm    WOUND REPAIR/DISPOSITION: see below    ---------------------------------------    SECOND SITE: left ear    INDICATION: basal cell carcinoma, recurrent    CASE NUMBER: [id number]      ANESTHETIC: as above    SURGICAL PREP: as above    SURGEON: Adrian Reyes MD    ASSISTANTS: as above    PREOPERATIVE DIAGNOSIS: basal cell carcinoma     POSTOPERATIVE DIAGNOSIS: basal cell carcinoma     PATHOLOGIC DIAGNOSIS: basal cell carcinoma     STAGES OF MOHS' SURGERY PERFORMED: two    TUMOR-FREE PLANE ACHIEVED: yes    HEMOSTASIS: Hyfrecation     SPECIMENS: five (four in stage A, one in stage B)    INITIAL LESION SIZE: 2.0 x 2.5 cm    FINAL DEFECT SIZE: 2.5 x 2.7 cm    WOUND REPAIR/DISPOSITION: see below    NARRATIVE:  The patient is a 89 y.o.male referred by Sherwin Alatorre MD with a history of cancer on the left ear and on the left upper lip which were biopsied - pathology accession #[id number], [facility] (left upper lip site) and #[id number], [facility] (left ear). Findings revealed basal cell carcinoma  at the left ear site and basal cell carcinoma   at the left upper lip site. The left ear site has had prior surgery performed by Dr. Davila, and is clinically recurrent Examination revealed a pearly tumor on the left upper lip and an ulcerated tumor on the left ear at the sites of prior biopsies, which were confirmed by reference to the photograph(s) taken at the previous patient visit. In light of the nature of these tumors and the locations on the ear and lip, Mohs' micrographic surgery was thought to be the most appropriate management choice, and these diagnoses are appropriate for treatment by Mohs' micrographic surgery.  I discussed it with the patient and he fully understands the aims, risks, alternatives, and possible complications, and elects to proceed.  There are no medical or surgical contraindications to the procedure.     A signed informed consent was obtained.      PROCEDURE:  The site on the upper lip was addressed first.   The patient was placed in the semi-recumbent position on the operating table in the Mohs' Surgery Suite.The area in question was thoroughly prepped with ethanol and Betadine. A sterile surgical marker was used to outline the clinically apparent margins of the involved area, and a narrow margin of normal-appearing skin. Reference marks were made at the periphery of the outlined area with the surgical marker. The proposed area of excision was measured and photographed. Local anesthesia of 2% Lidocaine with 1:200,000 epinephrine was administered.  The total volume of anesthetic used throughout this portion of the procedure was as documented above. The area was prepared and draped in the standard manner. All of the grossly identifiable area of clinically abnormal tissue and an underlying/peripheral layer was taken and processed by the Mohs' technique.  Hemostasis was obtained with the hyfrecator. Tissue was taken from any areas of residual marginal involvement (if present) and processed by the Mohs' technique in as many stages as  "needed until a tumor-free plane was achieved.    Next, the site on the left ear was addressed.   The patient was placed in the right lateral decubitus position on the operating table in the Mohs' Surgery Suite.The area in question was thoroughly prepped with ethanol and Betadine. A sterile surgical marker was used to outline the clinically apparent margins of the involved area, and a narrow margin of normal-appearing skin. Reference marks were made at the periphery of the outlined area with the surgical marker. The proposed area of excision was measured and photographed. Local anesthesia of 2% Lidocaine with 1:100,000 epinephrine was administered.  The total volume of anesthetic used throughout this portion of the procedure was as documented above. The area was prepared and draped in the standard manner. All of the grossly identifiable area of clinically abnormal tissue and an underlying/peripheral layer was taken and processed by the Mohs' technique.  Hemostasis was obtained with the hyfrecator. Tissue was taken from any areas of residual marginal involvement (if present) and processed by the Mohs' technique in as many stages as needed until a tumor-free plane was achieved.    Colors of inks used in the reference nicks at epidermal margins (if present) and/or inking of non-epithelial edges, if applicable, is represented on the Mohs map as follows: solid lines represent red ink, dots represent blue ink, jagged lines represent black ink, curlicues represent green ink, "xxx" represents yellow ink.    FIRST SITE: left upper lip  The first Mohs' layer consisted of one section(s) with 4 slide(s) evaluated. No residual tumor was noted at the margins of the first Mohs' layer. Histology of the specimen(s) showed changes consistent with chronic solar damage.    A total of one section(s) and 4 slide(s) were examined under the microscope via the Mohs technique.  A cancer free plane was reached after layer number one. Defect " final size was as noted above.     SECOND SITE: left ear  The first Mohs' layer consisted of four section(s) with 10 slide(s) evaluated. Some residual tumor was noted at the margins of the first Mohs' layer. Histology of the specimen(s) showed residual basal cell carcinoma, consisting of nests of basaloid basophilic cells, at the deep margin on sections two and three, as noted on the Mohs map.     The second Mohs' layer consisted of one section(s) with 2 slide(s) evaluated. No residual tumor was noted at the margins of the second Mohs' layer. Histology of the specimen(s) showed normal cutaneous findings.    A total of five section(s) and 12 slide(s) were examined under the microscope via the Mohs technique.  A cancer free plane was reached after layer number two. Defect final size was as noted above.      The wounds were covered with nonadherent dressings between stages, and the patient allowed to wait in the waiting area during these periods. The final defects were photographed at the completion of the Mohs' procedure.    The patient was returned to the procedure room following completion of the Mohs' procedure and final slide review. The patient is scheduled to see Sherwin Alatorre for closure of the defects on Friday as previously arranged.    Final dressing(s) consisted of Telfa and tape on the lip site, and petrolatum gauze, gauze and tape to the left ear site    Estimated blood loss for the total procedure was less than 10 mL.    Total operative time including tissue processing in the Mohs' laboratory and microscopic Mohs' frozen section slide review was 3.5 hour(s). Verbal and written wound care instructions were given to the patient, and he expressed understanding of these instructions. The patient tolerated the procedure well and left the operating room in good condition; he is to return PRN to me for followup.     Dr. Reyes's cell phone number was given to the patient with instructions to call prn with  any problems.

## 2018-02-22 ENCOUNTER — ANESTHESIA EVENT (OUTPATIENT)
Dept: SURGERY | Facility: HOSPITAL | Age: 83
End: 2018-02-22
Payer: MEDICARE

## 2018-02-23 ENCOUNTER — SURGERY (OUTPATIENT)
Age: 83
End: 2018-02-23

## 2018-02-23 ENCOUNTER — ANESTHESIA (OUTPATIENT)
Dept: SURGERY | Facility: HOSPITAL | Age: 83
End: 2018-02-23
Payer: MEDICARE

## 2018-02-23 ENCOUNTER — HOSPITAL ENCOUNTER (OUTPATIENT)
Facility: HOSPITAL | Age: 83
Discharge: HOME | End: 2018-02-23
Attending: OTOLARYNGOLOGY | Admitting: OTOLARYNGOLOGY
Payer: MEDICARE

## 2018-02-23 VITALS
OXYGEN SATURATION: 98 % | BODY MASS INDEX: 20.46 KG/M2 | HEART RATE: 60 BPM | HEIGHT: 68 IN | DIASTOLIC BLOOD PRESSURE: 70 MMHG | RESPIRATION RATE: 16 BRPM | WEIGHT: 135 LBS | TEMPERATURE: 98 F | SYSTOLIC BLOOD PRESSURE: 168 MMHG

## 2018-02-23 DIAGNOSIS — C44.219 BASAL CELL CARCINOMA (BCC) OF SKIN OF LEFT EAR: ICD-10-CM

## 2018-02-23 DIAGNOSIS — C44.329 SQUAMOUS CELL CANCER OF SKIN OF LEFT CHEEK: Primary | ICD-10-CM

## 2018-02-23 DIAGNOSIS — D49.0: ICD-10-CM

## 2018-02-23 PROCEDURE — D9220A PRA ANESTHESIA: Mod: ANES,,, | Performed by: ANESTHESIOLOGY

## 2018-02-23 PROCEDURE — 37000008 HC ANESTHESIA 1ST 15 MINUTES: Mod: PO | Performed by: OTOLARYNGOLOGY

## 2018-02-23 PROCEDURE — 63600175 PHARM REV CODE 636 W HCPCS: Mod: PO | Performed by: OTOLARYNGOLOGY

## 2018-02-23 PROCEDURE — D9220A PRA ANESTHESIA: Mod: CRNA,,, | Performed by: NURSE ANESTHETIST, CERTIFIED REGISTERED

## 2018-02-23 PROCEDURE — 63600175 PHARM REV CODE 636 W HCPCS: Mod: PO | Performed by: NURSE ANESTHETIST, CERTIFIED REGISTERED

## 2018-02-23 PROCEDURE — 15260 FTH/GFT FR N/E/E/L 20 SQCM/<: CPT | Mod: LT,,, | Performed by: OTOLARYNGOLOGY

## 2018-02-23 PROCEDURE — 36000706: Mod: PO | Performed by: OTOLARYNGOLOGY

## 2018-02-23 PROCEDURE — 25000003 PHARM REV CODE 250: Mod: PO | Performed by: OTOLARYNGOLOGY

## 2018-02-23 PROCEDURE — 88305 TISSUE EXAM BY PATHOLOGIST: CPT | Performed by: PATHOLOGY

## 2018-02-23 PROCEDURE — 14040 TIS TRNFR F/C/C/M/N/A/G/H/F: CPT | Mod: 51,LT,, | Performed by: OTOLARYNGOLOGY

## 2018-02-23 PROCEDURE — 25000003 PHARM REV CODE 250: Mod: PO | Performed by: NURSE ANESTHETIST, CERTIFIED REGISTERED

## 2018-02-23 PROCEDURE — 36000707: Mod: PO | Performed by: OTOLARYNGOLOGY

## 2018-02-23 PROCEDURE — 37000009 HC ANESTHESIA EA ADD 15 MINS: Mod: PO | Performed by: OTOLARYNGOLOGY

## 2018-02-23 PROCEDURE — 71000033 HC RECOVERY, INTIAL HOUR: Mod: PO | Performed by: OTOLARYNGOLOGY

## 2018-02-23 RX ORDER — FENTANYL CITRATE 50 UG/ML
INJECTION, SOLUTION INTRAMUSCULAR; INTRAVENOUS
Status: DISCONTINUED | OUTPATIENT
Start: 2018-02-23 | End: 2018-02-23

## 2018-02-23 RX ORDER — FENTANYL CITRATE 50 UG/ML
25 INJECTION, SOLUTION INTRAMUSCULAR; INTRAVENOUS EVERY 5 MIN PRN
Status: DISCONTINUED | OUTPATIENT
Start: 2018-02-23 | End: 2018-02-23 | Stop reason: HOSPADM

## 2018-02-23 RX ORDER — LIDOCAINE HYDROCHLORIDE 10 MG/ML
0.5 INJECTION, SOLUTION EPIDURAL; INFILTRATION; INTRACAUDAL; PERINEURAL ONCE AS NEEDED
Status: DISCONTINUED | OUTPATIENT
Start: 2018-02-23 | End: 2018-02-23 | Stop reason: HOSPADM

## 2018-02-23 RX ORDER — SODIUM CHLORIDE, SODIUM LACTATE, POTASSIUM CHLORIDE, CALCIUM CHLORIDE 600; 310; 30; 20 MG/100ML; MG/100ML; MG/100ML; MG/100ML
INJECTION, SOLUTION INTRAVENOUS CONTINUOUS
Status: DISCONTINUED | OUTPATIENT
Start: 2018-02-23 | End: 2018-02-23 | Stop reason: HOSPADM

## 2018-02-23 RX ORDER — PROPOFOL 10 MG/ML
VIAL (ML) INTRAVENOUS CONTINUOUS PRN
Status: DISCONTINUED | OUTPATIENT
Start: 2018-02-23 | End: 2018-02-23

## 2018-02-23 RX ORDER — OXYCODONE HYDROCHLORIDE 5 MG/1
5 TABLET ORAL ONCE AS NEEDED
Status: DISCONTINUED | OUTPATIENT
Start: 2018-02-23 | End: 2018-02-23 | Stop reason: HOSPADM

## 2018-02-23 RX ORDER — DEXAMETHASONE SODIUM PHOSPHATE 4 MG/ML
8 INJECTION, SOLUTION INTRA-ARTICULAR; INTRALESIONAL; INTRAMUSCULAR; INTRAVENOUS; SOFT TISSUE
Status: DISCONTINUED | OUTPATIENT
Start: 2018-02-23 | End: 2018-02-23

## 2018-02-23 RX ORDER — ONDANSETRON 2 MG/ML
INJECTION INTRAMUSCULAR; INTRAVENOUS
Status: DISCONTINUED | OUTPATIENT
Start: 2018-02-23 | End: 2018-02-23

## 2018-02-23 RX ORDER — MUPIROCIN 20 MG/G
OINTMENT TOPICAL 2 TIMES DAILY
Qty: 15 G | Refills: 3 | Status: SHIPPED | OUTPATIENT
Start: 2018-02-23 | End: 2018-03-05

## 2018-02-23 RX ORDER — CEPHALEXIN 500 MG/1
500 CAPSULE ORAL EVERY 8 HOURS
Qty: 15 CAPSULE | Refills: 0 | Status: SHIPPED | OUTPATIENT
Start: 2018-02-23 | End: 2018-02-28

## 2018-02-23 RX ORDER — SODIUM CHLORIDE, SODIUM LACTATE, POTASSIUM CHLORIDE, CALCIUM CHLORIDE 600; 310; 30; 20 MG/100ML; MG/100ML; MG/100ML; MG/100ML
INJECTION, SOLUTION INTRAVENOUS CONTINUOUS
Status: DISCONTINUED | OUTPATIENT
Start: 2018-02-23 | End: 2018-02-23

## 2018-02-23 RX ORDER — LIDOCAINE HYDROCHLORIDE 10 MG/ML
INJECTION, SOLUTION EPIDURAL; INFILTRATION; INTRACAUDAL; PERINEURAL
Status: DISCONTINUED | OUTPATIENT
Start: 2018-02-23 | End: 2018-02-23 | Stop reason: HOSPADM

## 2018-02-23 RX ORDER — OXYCODONE AND ACETAMINOPHEN 5; 325 MG/1; MG/1
1 TABLET ORAL EVERY 4 HOURS PRN
Qty: 10 TABLET | Refills: 0 | Status: SHIPPED | OUTPATIENT
Start: 2018-02-23 | End: 2018-04-05 | Stop reason: ALTCHOICE

## 2018-02-23 RX ORDER — LIDOCAINE HYDROCHLORIDE 10 MG/ML
INJECTION, SOLUTION INTRAVENOUS
Status: DISCONTINUED | OUTPATIENT
Start: 2018-02-23 | End: 2018-02-23

## 2018-02-23 RX ORDER — BACITRACIN ZINC 500 UNIT/G
OINTMENT (GRAM) TOPICAL
Status: DISCONTINUED | OUTPATIENT
Start: 2018-02-23 | End: 2018-02-23 | Stop reason: HOSPADM

## 2018-02-23 RX ORDER — SODIUM CHLORIDE 0.9 % (FLUSH) 0.9 %
3 SYRINGE (ML) INJECTION
Status: DISCONTINUED | OUTPATIENT
Start: 2018-02-23 | End: 2018-02-23 | Stop reason: HOSPADM

## 2018-02-23 RX ORDER — LIDOCAINE HYDROCHLORIDE 10 MG/ML
0.5 INJECTION, SOLUTION EPIDURAL; INFILTRATION; INTRACAUDAL; PERINEURAL ONCE AS NEEDED
Status: DISCONTINUED | OUTPATIENT
Start: 2018-02-23 | End: 2018-02-23

## 2018-02-23 RX ORDER — SODIUM CHLORIDE 0.9 G/100ML
IRRIGANT IRRIGATION
Status: DISCONTINUED | OUTPATIENT
Start: 2018-02-23 | End: 2018-02-23 | Stop reason: HOSPADM

## 2018-02-23 RX ORDER — MIDAZOLAM HYDROCHLORIDE 1 MG/ML
INJECTION, SOLUTION INTRAMUSCULAR; INTRAVENOUS
Status: DISCONTINUED | OUTPATIENT
Start: 2018-02-23 | End: 2018-02-23

## 2018-02-23 RX ORDER — CEFAZOLIN SODIUM 1 G/3ML
2 INJECTION, POWDER, FOR SOLUTION INTRAMUSCULAR; INTRAVENOUS
Status: DISCONTINUED | OUTPATIENT
Start: 2018-02-23 | End: 2018-02-23 | Stop reason: HOSPADM

## 2018-02-23 RX ADMIN — LIDOCAINE HYDROCHLORIDE 50 MG: 10 INJECTION, SOLUTION INTRAVENOUS at 08:02

## 2018-02-23 RX ADMIN — LIDOCAINE HYDROCHLORIDE 10 ML: 10 INJECTION, SOLUTION EPIDURAL; INFILTRATION; INTRACAUDAL; PERINEURAL at 08:02

## 2018-02-23 RX ADMIN — MIDAZOLAM HYDROCHLORIDE 0.5 MG: 1 INJECTION, SOLUTION INTRAMUSCULAR; INTRAVENOUS at 08:02

## 2018-02-23 RX ADMIN — ONDANSETRON 4 MG: 2 INJECTION, SOLUTION INTRAMUSCULAR; INTRAVENOUS at 09:02

## 2018-02-23 RX ADMIN — SODIUM CHLORIDE 500 ML: 0.9 IRRIGANT IRRIGATION at 08:02

## 2018-02-23 RX ADMIN — FENTANYL CITRATE 25 MCG: 50 INJECTION, SOLUTION INTRAMUSCULAR; INTRAVENOUS at 10:02

## 2018-02-23 RX ADMIN — FENTANYL CITRATE 25 MCG: 50 INJECTION, SOLUTION INTRAMUSCULAR; INTRAVENOUS at 08:02

## 2018-02-23 RX ADMIN — CEFAZOLIN 2 G: 1 INJECTION, POWDER, FOR SOLUTION INTRAVENOUS at 08:02

## 2018-02-23 RX ADMIN — SODIUM CHLORIDE, SODIUM LACTATE, POTASSIUM CHLORIDE, CALCIUM CHLORIDE: 600; 310; 30; 20 INJECTION, SOLUTION INTRAVENOUS at 07:02

## 2018-02-23 RX ADMIN — PROPOFOL 30 MCG/KG/MIN: 10 INJECTION, EMULSION INTRAVENOUS at 08:02

## 2018-02-23 RX ADMIN — BACITRACIN ZINC 1 TUBE: 500 OINTMENT TOPICAL at 10:02

## 2018-02-23 RX ADMIN — SODIUM CHLORIDE, SODIUM LACTATE, POTASSIUM CHLORIDE, CALCIUM CHLORIDE: 600; 310; 30; 20 INJECTION, SOLUTION INTRAVENOUS at 08:02

## 2018-02-23 NOTE — OP NOTE
02/23/2018     Thomas Jennings  [id number]  7/30/1922    PRE-OPERATIVE DIAGNOSIS  1. Basal cell carcinoma of the upper left lip  2. 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 S/p Moh's microsurgery excision of 1 and 2    POST-OPERATIVE DIAGNOSIS  1. Basal cell carcinoma of the upper left lip  2. 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 S/p Moh's microsurgery excision of 1 and 2    PROCEDURES PERFORMED  1. Full Thickness Skin Graft of left supraclavicular region for closure of left ear defect, 3 x 4 cm (84022)  2. Local Tissue Advancement Closure of left upper lip defect, 1.5 x 1 cm (26734)  3. Excision of malignant lesion left cheek 2.7 x 3.5 cm (27366)  4. Rhomboid flap local tissue rearrangement closure of left cheek defect, 2.7 x 3.5 (04324)    SURGEON: Sherwin Alatorre MD    ASSISTANT: None    ANESTHESIA: MAC    COMPLICATIONS: None    BLOOD LOSS: 30 cc    SPECIMENS  1. Left Upper Cheek Lesion - 1 stitch anterior, 2 stitch inferior    DISPOSITION  PACU    OPERATIVE FINDINGS  1. Concave defect of the left ear and pre-auricular region involving helical root, conchal bowl, superior tragus, superior EAC, and anti-helix region, measuring 3 x 3 cm. Depth included skin and subcutaneous tissue. Cartilage preserved. Full thickness skin graft to reconstruct.  2. Defect of the upper left lip at the alar-facial junction, just inferior to the nasolabial crease. measuring 1 x 1.5 cm. Depth included skin and subcutaneous tissue  3. Previously biopsied superior cheek / lateral canthus invasive SCCa with surrounding actinic change excised with at least 5 mm margins around ulcerative border. Reconstructed with an inferiorly based rhomboid flap.     INDICATIONS  Thomas Jennings is a 89 y.o. male who was seen in clinic for evaluation of the above diagnosis. he underwent  Moh's microsurgical excision for two of the lesions the day prior. The Moh's surgeon asked me to excise the SCCa of lateral canthus / superior cheek and close. he presented to me for reconstructive efforts. Based on clinical assessment, the following procedures were discussed as an option for treatment. I discussed the reconstructive ladder as well as risks of the procedure including scar, cosmetic deformity, change in appearance, asymmetry, wound healing issues/skin breakdown, hematoma, infection. Facial weakness/paralysis, graft failure. After risks, benefits, and alternatives were discussed the patient decided to proceed.    PROCEDURE IN DETAIL  The patient was seen in the pre-operative holding area, and consent was signed. They were wheeled into the operating room and placed supine on the table. Anesthesia was induced via MAC and a nasal cannula was placed. A timeout was performed.    I examined the defect. Findings are indicated above. The edges of the defect were freshened and measured. The planned incision was injected with 1% Lidocaine with 1:100,000 epinephrine. He was prepped with Betadine.    I first proceeded with closure of the upper lip defect. I freshened the wound edges. I planned an incision along the nasolabial crease as well as a perialar incision. I made the cuts and dissected in a supraSMAS plane to allow for an advancement closure. The angular branch of the facial artery was preserved. I elevated the upper lip skin flap and rotated this into place. I back elevated into the cheek a little to allow for a tension free closure. I approximated the edges and cut out a small amount of excess skin right outside the nasal vestibule in order prevent a standing tissue cone. I closed the flap with 4-0 Vicryl in the deep layer and 5-0 plain gut for the skin.    I then harvested my full thickness skin graft from the left shoulder. I planned an ellipitcal incision to harvest a 3 x 4 cm skin graft parallel to  skin creases. I made my incisions through skin and dermis. I elevated the skin graft in the immediate subdermal layer. Once the graft was harvested. I thinned the skin graft and set in on the back table for later. Hemostasis was good. I elevated the flaps anterior and posterior to my harvest site in the subcutaneous layer to allow for a tension free closure. I closed the donor site with 4-0 Vicryl in deep and a running 5-0 plain gut.     I then inset the skin graft into the concave ear defect, tacking it to conchal and helical cartilage perichondrium with 4-0 Vicryl in the deep layer. It fit well and I trimmed the graft to size. I then sutured the graft at the periphery with 5-0 plain gut. I then placed 3-0 and 5-0 Prolene sutures at the periphery with long tails to secure my bolster. I used a Xeroform bolster to apply pressure to the graft and this was tied down with the excess Prolene tails.     I then focused on the malignant lesion of the upper left cheek. The lesion was in the location described above and had the following appearance: central ulcerative lesion ~10 mm in size with actinic change continuous with it extending inferior and anterior. I marked a 5 mm margin around it the tumor and included excision of the actinic change that had been marked by Dr. Reyes from a previous visit in a picture in the chart. 1% Lidocaine with 1:100,000 epinephrine. I proceeded with excision of the lesion, with the deep aspect extending into the subcutaneous tissue. The lesion was excised en bloc and sent for pathology. Hemostasis was achieved.    I then proceeded with closure of the superior cheek defect. Incision was planned as follows: I had planned a kory shaped defect and created an inferiorly based rhomboid flap from the more obtuse angle. I made my incisions through skin and subcutaneous tissue. I elevated flaps beneath the two aspects of the wound. Above the SMAS layer. Hemostasis was achieved. I widely  undermind and back elevated in the temporal region to provide a tension free closure. I rotated the rhomboid flap into place and secured the maximal tension point to confirm good closure. There was no tension on the lid and the patient could close and open his eye without any issue. I then meticulously approximated the edges of the tissue to minimize tension on the wound. I closed the flap with 4-0 Vicryl in the deep layer and 5-0 plain gut for the skin.    Antibiotic ointment was applied.    This marked the end of the procedure. The patient was turned back over to the Anesthesia team.  They were awoken and transported back to the PACU in stable condition. Counts were correct. I performed the entire procedure.

## 2018-02-23 NOTE — ANESTHESIA POSTPROCEDURE EVALUATION
"Anesthesia Post Evaluation    Patient: Thomas Jennings    Procedure(s) Performed: Procedure(s) (LRB):  Excision of malignant left ear lesion (Left)  GRAFT-SKIN-FULL THICKNESS (Left)    Final Anesthesia Type: general  Patient location during evaluation: PACU  Patient participation: Yes- Able to Participate  Level of consciousness: awake and alert and oriented  Post-procedure vital signs: reviewed and stable  Pain management: adequate  Airway patency: patent  PONV status at discharge: No PONV  Anesthetic complications: no      Cardiovascular status: blood pressure returned to baseline  Respiratory status: unassisted, spontaneous ventilation and room air  Hydration status: euvolemic  Follow-up not needed.        Visit Vitals  BP (!) 165/79 (BP Location: Left arm, Patient Position: Lying)   Pulse (!) 55   Temp 36.6 °C (97.8 °F) (Skin)   Resp 16   Ht 5' 8" (1.727 m)   Wt 61.2 kg (135 lb)   SpO2 98%   BMI 20.53 kg/m²       Pain/Lacy Score: Pain Assessment Performed: Yes (2/23/2018 11:19 AM)  Presence of Pain: denies (2/23/2018 11:19 AM)  Lacy Score: 10 (2/23/2018 11:19 AM)      "

## 2018-02-23 NOTE — DISCHARGE INSTRUCTIONS
Reconstruction of Moh's Defect  Sherwin Alatorre MD  Otolaryngology, [facility]  [address] [facility] [address], [place] [address]    What to Expect:  1. Soreness / Tenderness around the area which will decrease over time. The initial swelling will improve over a week then the additional swelling will take a few weeks to improve.  2. Occasional bruising immediately over the incision line  3. Swelling (edema) will occur over the first few days of healing, but this should be soft, not discolored, and should not be much more tender  4. Sutures will need to be removed.     Care for your wound:  1. Keep the wound dry (out of the shower/bath) for the first 24-36 hours. You may put ointment on the wound during this time.  2. After 1-2 days, you may get the wound wet, but do not soak it. You may gently clean the incision with warm soapy water.   3. If crusting builds up over the sutures, You may use diluted hydrogen peroxide (1/2 distilled water and 1/2 peroxide) to gently rub the crusting away. This can be done 3-4 times per day as needed.  4. There is a bolster (dressing) that is sutured over the ear. This will remain in place until your post-operative visit. You can clean this with hydrogen peroxide   5. Keep the wound moisturized with antibiotic ointment for 2-3 days, then transition to Aquaphor or Vaseline. Moisturization is important to minimize scar.    Activity:  1. Light activity for 2-3 days. You may slowly increase your activity following this, but generally keep it light for the first 7-10 days. Avoid strenuous exercise for the first week.   2. Avoid pressure to the incision  3. You may try to wear the hearing aid if you are able. This may not fit while the dressing is in place.     Medications:  1. Resume your normal home medications. If you were on a blood thinner, check with Dr. Alatorre to see when this can be resumed.  2. Avoid Motrin or Advil products for the first week. It is OK for an occasional dose of this in  between Tylenol or narcotic, but do not take scheduled Advil unless this has been Ok'd by Dr. Alatorre.   3. If you have been prescribed a pain medication (narcotic), use it sparingly and wean yourself from it over the first few days.  4. Do not take the following herbal supplements: fish oil, garlic, ginko biloba for 2 weeks. Avoid all supplements for 2 weeks if able, as these can sometimes have medical side effects that can impair wound healing.    Diet:  Resume your normal Diet    Call the office if:  1. Redness or firm swelling develop over the wound. A small amount of soft swelling is normal, especially after 3-4 days, but if it is hard, painful, or very red, notify Dr. Alatorre's office.  2. Temperature > 101  3. Drainage from wound  4. If the wound opens up.    Phone numbers:  Office: [phone]  Cell (after-hours concerns): [phone]      Discharge Instructions: After Your Surgery  Youve just had surgery. During surgery, you were given medicine called anesthesia to keep you relaxed and free of pain. After surgery, you may have some pain or nausea. This is common. Here are some tips for feeling better and getting well after surgery.     Stay on schedule with your medicine.   Going home  Your healthcare provider will show you how to take care of yourself when you go home. He or she will also answer your questions. Have an adult family member or friend drive you home. For the first 24 hours after your surgery:  · Do not drive or use heavy equipment.  · Do not make important decisions or sign legal papers.  · Do not drink alcohol.  · Have someone stay with you, if needed. He or she can watch for problems and help keep you safe.  Be sure to go to all follow-up visits with your healthcare provider. And rest after your surgery for as long as your healthcare provider tells you to.  Coping with pain  If you have pain after surgery, pain medicine will help you feel better. Take it as told, before pain becomes  severe. Also, ask your healthcare provider or pharmacist about other ways to control pain. This might be with heat, ice, or relaxation. And follow any other instructions your surgeon or nurse gives you.  Tips for taking pain medicine  To get the best relief possible, remember these points:  · Pain medicines can upset your stomach. Taking them with a little food may help.  · Most pain relievers taken by mouth need at least 20 to 30 minutes to start to work.  · Taking medicine on a schedule can help you remember to take it. Try to time your medicine so that you can take it before starting an activity. This might be before you get dressed, go for a walk, or sit down for dinner.  · Constipation is a common side effect of pain medicines. Call your healthcare provider before taking any medicines such as laxatives or stool softeners to help ease constipation. Also ask if you should skip any foods. Drinking lots of fluids and eating foods such as fruits and vegetables that are high in fiber can also help. Remember, do not take laxatives unless your surgeon has prescribed them.  · Drinking alcohol and taking pain medicine can cause dizziness and slow your breathing. It can even be deadly. Do not drink alcohol while taking pain medicine.  · Pain medicine can make you react more slowly to things. Do not drive or run machinery while taking pain medicine.  Your healthcare provider may tell you to take acetaminophen to help ease your pain. Ask him or her how much you are supposed to take each day. Acetaminophen or other pain relievers may interact with your prescription medicines or other over-the-counter (OTC) medicines. Some prescription medicines have acetaminophen and other ingredients. Using both prescription and OTC acetaminophen for pain can cause you to overdose. Read the labels on your OTC medicines with care. This will help you to clearly know the list of ingredients, how much to take, and any warnings. It may also help  you not take too much acetaminophen. If you have questions or do not understand the information, ask your pharmacist or healthcare provider to explain it to you before you take the OTC medicine.  Managing nausea  Some people have an upset stomach after surgery. This is often because of anesthesia, pain, or pain medicine, or the stress of surgery. These tips will help you handle nausea and eat healthy foods as you get better. If you were on a special food plan before surgery, ask your healthcare provider if you should follow it while you get better. These tips may help:  · Do not push yourself to eat. Your body will tell you when to eat and how much.  · Start off with clear liquids and soup. They are easier to digest.  · Next try semi-solid foods, such as mashed potatoes, applesauce, and gelatin, as you feel ready.  · Slowly move to solid foods. Dont eat fatty, rich, or spicy foods at first.  · Do not force yourself to have 3 large meals a day. Instead eat smaller amounts more often.  · Take pain medicines with a small amount of solid food, such as crackers or toast, to avoid nausea.     Call your surgeon if  · You still have pain an hour after taking medicine. The medicine may not be strong enough.  · You feel too sleepy, dizzy, or groggy. The medicine may be too strong.  · You have side effects like nausea, vomiting, or skin changes, such as rash, itching, or hives.       If you have obstructive sleep apnea  You were given anesthesia medicine during surgery to keep you comfortable and free of pain. After surgery, you may have more apnea spells because of this medicine and other medicines you were given. The spells may last longer than usual.   At home:  · Keep using the continuous positive airway pressure (CPAP) device when you sleep. Unless your healthcare provider tells you not to, use it when you sleep, day or night. CPAP is a common device used to treat obstructive sleep apnea.  · Talk with your provider  before taking any pain medicine, muscle relaxants, or sedatives. Your provider will tell you about the possible dangers of taking these medicines.  Date Last Reviewed: 12/1/2016  © 2137-6422 [employer]. [address] [place] [address], [place] [address]. All rights reserved. This information is not intended as a substitute for professional medical care. Always follow your healthcare professional's instructions.

## 2018-02-23 NOTE — BRIEF OP NOTE
[facility]  Brief Operative Note     SUMMARY     Surgery Date: 2/23/2018     Surgeon(s) and Role:     * Sherwin Alatorre MD - Primary    Assisting Surgeon: None    Pre-op Diagnosis:  Basal cell carcinoma, ear, left [C44.219]    Post-op Diagnosis:  Post-Op Diagnosis Codes:     * Basal cell carcinoma, ear, left [C44.219]    Procedure(s) (LRB):  Excision of malignant left ear lesion (Left)  GRAFT-SKIN-FULL THICKNESS (Left)    Anesthesia: General    Description of the findings of the procedure: Closure of upper lip, ear wound, excision of malignant lesion cheek with tissue rearrangement closure    Findings/Key Components: as above    Estimated Blood Loss: * No values recorded between 2/23/2018  8:41 AM and 2/23/2018 11:17 AM *         Specimens:   Specimen (12h ago through future)    Start     Ordered    02/23/18 1041  Specimen to Pathology - Surgery  Once     Comments:  Pre-op Diagnosis: Basal cell carcinoma, ear, left [C44.219]Post-op Diagnosis: unknownProcedure(s):Excision of malignant left ear lesionGRAFT-SKIN-FULL THICKNESS Number of specimens: 1Name of specimens: 1.left superior cheek lesion. Single stitch anterior. Double stitch inferior      02/23/18 1044          Discharge Note    SUMMARY     Admit Date: 2/23/2018    Discharge Date and Time:  02/23/2018 11:27 AM    Hospital Course (synopsis of major diagnoses, care, treatment, and services provided during the course of the hospital stay): Did well following surgery and was discharged uneventfully     Final Diagnosis: Post-Op Diagnosis Codes:     * Basal cell carcinoma, ear, left [C44.219]    Disposition: Home or Self Care    Follow Up/Patient Instructions: Regular diet, Follow-up 10 days for bolster removal. Activity light    Medications:  Reconciled Home Medications:   Current Discharge Medication List      START taking these medications    Details   cephALEXin (KEFLEX) 500 MG capsule Take 1 capsule (500 mg total) by mouth every 8 (eight)  hours.  Qty: 15 capsule, Refills: 0      mupirocin (BACTROBAN) 2 % ointment Apply topically 2 (two) times daily. Apply to incisions  Qty: 15 g, Refills: 3      oxyCODONE-acetaminophen (PERCOCET) 5-325 mg per tablet Take 1 tablet by mouth every 4 (four) hours as needed.  Qty: 10 tablet, Refills: 0           No discharge procedures on file.  Follow-up Information     Sherwin Alatorre MD On 3/5/2018.    Specialty:  Otolaryngology  Contact information:  [address] [place] [address]  [phone]

## 2018-02-23 NOTE — H&P
Subjective:       Patient ID: Thomas Jennings is a 89 y.o. male.    Chief Complaint: No chief complaint on file.      Thomas is here for skin cancer removal of lateral canthus, left and reconstruction of Mohs defects. No changes since clinic visit     Review of Systems   Constitutional: Negative for activity change and appetite change.   Eyes: Negative for discharge.   Respiratory: Negative for difficulty breathing and wheezing   Cardiovascular: Negative for chest pain.   Gastrointestinal: Negative for abdominal distention and abdominal pain.   Endocrine: Negative for cold intolerance and heat intolerance.   Genitourinary: Negative for dysuria.   Musculoskeletal: Negative for gait problem and joint swelling.   Skin: Negative for color change and pallor.   Neurological: Negative for syncope and weakness.   Psychiatric/Behavioral: Negative for agitation and confusion.     Objective:        Constitutional:   He is oriented to person, place, and time. He appears well-developed and well-nourished. He appears alert. He is active. Normal speech.      Head:  Normocephalic and atraumatic. Head is without TMJ tenderness. No scars. Salivary glands normal.  Facial strength is normal.    Bandages over medial nasolabial fold and left helix.   Ulcerated lesion of lateral canthus superior cheek     Ears:    Right Ear: No drainage or swelling. No middle ear effusion. Decreased hearing is noted.   Left Ear: No drainage or swelling.  No middle ear effusion. Decreased hearing is noted.     Nose:  No mucosal edema, rhinorrhea or sinus tenderness. No turbinate hypertrophy.      Mouth/Throat  Oropharynx clear and moist without lesions or asymmetry, normal uvula midline and mirror exam normal. Normal dentition. No uvula swelling, lacerations or trismus. No oropharyngeal exudate. Tonsillar erythema, tonsillar exudate.      Neck:  Full range of motion with neck supple and no adenopathy. Thyroid tenderness is present. No tracheal deviation,  no edema, no erythema, normal range of motion, no stridor, no crepitus and no neck rigidity present. No thyroid mass present.     Cardiovascular:   Intact distal pulses and normal pulses.      Pulmonary/Chest:   Effort normal and breath sounds normal. No stridor.     Psychiatric:   His speech is normal and behavior is normal. His mood appears not anxious. His affect is not labile.     Neurological:   He is alert and oriented to person, place, and time. No sensory deficit.     Skin:   No abrasions, lacerations, lesions, or rashes. No abrasion and no bruising noted.         Tests / Results:  Reviewed     Assessment:       1. 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 Basal cell carcinoma (BCC) of skin of left ear    3. Neoplasm of upper lip          Plan:         Surgery as planned

## 2018-02-23 NOTE — PLAN OF CARE
VSS, all questions answered. Denies recent fever or illness. Pt with decreased hearing but able to communicate well. Left ear dressing with small amt of serosanguineous drainage noted and left face dressing clean and dry. Pt states ready for procedure.

## 2018-02-23 NOTE — ANESTHESIA PREPROCEDURE EVALUATION
02/23/2018  Thomas Jennings is a 89 y.o., male.    Anesthesia Evaluation      I have reviewed the Medications.     Review of Systems  Anesthesia Hx:  No problems with previous Anesthesia   Social:  Non-Smoker, No Alcohol Use    EENT/Dental:   Hard of hearing   Cardiovascular:  Cardiovascular Normal  ECG has been reviewed. LBBB   Pulmonary:  Pulmonary Normal    Renal/:   BPH Hx urinary retention   Hepatic/GI:  Hepatic/GI Normal    Neurological:  Neurology Normal    Endocrine:  Endocrine Normal    Dermatological:   Skin cancers       Physical Exam  General:  Well nourished    Airway/Jaw/Neck:  Airway Findings: Mouth Opening: Normal General Airway Assessment: Adult  Mallampati: II  Jaw/Neck Findings:  Neck ROM: Extension Decreased, Mild       Chest/Lungs:  Chest/Lungs Findings: Clear to auscultation, Normal Respiratory Rate     Heart/Vascular:  Heart Findings: Rate: Normal  Rhythm: Regular Rhythm  Sounds: Normal  Heart murmur: negative Vascular Findings: Normal (No carotid bruits.)       Mental Status:  Mental Status Findings:  Cooperative, Alert and Oriented         Anesthesia Plan  Type of Anesthesia, risks & benefits discussed:  Anesthesia Type:  MAC  Patient's Preference:   Intra-op Monitoring Plan:   Intra-op Monitoring Plan Comments:   Post Op Pain Control Plan:   Post Op Pain Control Plan Comments:   Induction:    Beta Blocker:  Patient is not currently on a Beta-Blocker (No further documentation required).       Informed Consent: Patient understands risks and agrees with Anesthesia plan.  Questions answered. Anesthesia consent signed with patient.  ASA Score: 3     Day of Surgery Review of History & Physical:            Ready For Surgery From Anesthesia Perspective.

## 2018-02-23 NOTE — TRANSFER OF CARE
"Anesthesia Transfer of Care Note    Patient: Thomas Jennings    Procedure(s) Performed: Procedure(s) (LRB):  Excision of malignant left ear lesion (Left)  GRAFT-SKIN-FULL THICKNESS (Left)    Patient location: PACU    Anesthesia Type: MAC    Transport from OR: Transported from OR on room air with adequate spontaneous ventilation    Post pain: adequate analgesia    Post assessment: no apparent anesthetic complications    Post vital signs: stable    Level of consciousness: awake    Nausea/Vomiting: no nausea/vomiting    Complications: none    Transfer of care protocol was followed      Last vitals:   Visit Vitals  BP (!) 166/76 (BP Location: Left arm, Patient Position: Lying)   Pulse (!) 59   Temp 36.6 °C (97.8 °F) (Skin)   Resp 16   Ht 5' 8" (1.727 m)   Wt 61.2 kg (135 lb)   SpO2 96%   BMI 20.53 kg/m²     "

## 2018-03-02 ENCOUNTER — TELEPHONE (OUTPATIENT)
Dept: OTOLARYNGOLOGY | Facility: CLINIC | Age: 83
End: 2018-03-02

## 2018-03-02 NOTE — TELEPHONE ENCOUNTER
----- Message from RT Maday sent at 3/2/2018 10:41 AM CST -----  Contact: Ne,Friend, [phone]   Ne,Friend, [phone], requesting a call back soon concerning the pt's F/U appt, thanks.

## 2018-03-06 ENCOUNTER — OFFICE VISIT (OUTPATIENT)
Dept: OTOLARYNGOLOGY | Facility: CLINIC | Age: 83
End: 2018-03-06
Payer: MEDICARE

## 2018-03-06 VITALS — WEIGHT: 145.06 LBS | HEIGHT: 68 IN | BODY MASS INDEX: 21.99 KG/M2

## 2018-03-06 DIAGNOSIS — C44.329 SQUAMOUS CELL CANCER OF SKIN OF LEFT CHEEK: Primary | ICD-10-CM

## 2018-03-06 DIAGNOSIS — C44.219 BASAL CELL CARCINOMA (BCC) OF SKIN OF LEFT EAR: ICD-10-CM

## 2018-03-06 DIAGNOSIS — D49.0: ICD-10-CM

## 2018-03-06 PROCEDURE — 99213 OFFICE O/P EST LOW 20 MIN: CPT | Mod: PBBFAC,PO | Performed by: OTOLARYNGOLOGY

## 2018-03-06 PROCEDURE — 99999 PR PBB SHADOW E&M-EST. PATIENT-LVL III: CPT | Mod: PBBFAC,,, | Performed by: OTOLARYNGOLOGY

## 2018-03-06 PROCEDURE — 99024 POSTOP FOLLOW-UP VISIT: CPT | Mod: POP,,, | Performed by: OTOLARYNGOLOGY

## 2018-03-07 NOTE — PROGRESS NOTES
Thomas is here for a post-operative visit following Moh's reconstruction of left upper lip, nasolabial fold, FTSG to left ear defect, and WLE of left lateral cheek/temporal area with rhomboid flap    No issues, doing well  Pain controlled    Exam:  Alert, active, appropriate  Incision c/d/i, wound with appropriate mild edema, mild erythema  Bolster removed. 70% skin graft take. Crusting debrided. EAC patent and cleaned.   Sutures removed    Path:  WLE of lateral left cheek/temporal area Invasive SCCa. Tumor extended to deep margin, peripheral margins clear.     Healing well  Deep margin is positive. This would possibly extend to the region of frontal branch of facial nerve. For now observe. May need to consider re-excision of suspected area with 2ndary intention healing.  Follow-up 2 weeks for wound check

## 2018-03-20 ENCOUNTER — OFFICE VISIT (OUTPATIENT)
Dept: OTOLARYNGOLOGY | Facility: CLINIC | Age: 83
End: 2018-03-20
Payer: MEDICARE

## 2018-03-20 VITALS
HEIGHT: 68 IN | BODY MASS INDEX: 22.22 KG/M2 | WEIGHT: 146.63 LBS | SYSTOLIC BLOOD PRESSURE: 138 MMHG | DIASTOLIC BLOOD PRESSURE: 62 MMHG

## 2018-03-20 DIAGNOSIS — C44.329 SQUAMOUS CELL CANCER OF SKIN OF LEFT CHEEK: ICD-10-CM

## 2018-03-20 DIAGNOSIS — C44.219 BASAL CELL CARCINOMA (BCC) OF SKIN OF LEFT EAR: Primary | ICD-10-CM

## 2018-03-20 DIAGNOSIS — D49.0: ICD-10-CM

## 2018-03-20 PROCEDURE — 99999 PR PBB SHADOW E&M-EST. PATIENT-LVL III: CPT | Mod: PBBFAC,,, | Performed by: OTOLARYNGOLOGY

## 2018-03-20 PROCEDURE — 99213 OFFICE O/P EST LOW 20 MIN: CPT | Mod: PBBFAC,PO | Performed by: OTOLARYNGOLOGY

## 2018-03-20 PROCEDURE — 87186 SC STD MICRODIL/AGAR DIL: CPT

## 2018-03-20 PROCEDURE — 87070 CULTURE OTHR SPECIMN AEROBIC: CPT

## 2018-03-20 PROCEDURE — 99024 POSTOP FOLLOW-UP VISIT: CPT | Mod: POP,,, | Performed by: OTOLARYNGOLOGY

## 2018-03-20 PROCEDURE — 87077 CULTURE AEROBIC IDENTIFY: CPT

## 2018-03-20 RX ORDER — SILVER SULFADIAZINE 10 G/1000G
CREAM TOPICAL DAILY
Qty: 400 G | Refills: 1 | Status: SHIPPED | OUTPATIENT
Start: 2018-03-20 | End: 2018-12-10

## 2018-03-20 NOTE — PROGRESS NOTES
Thomas is here for a post-operative visit following Moh's reconstruction of left upper lip, nasolabial fold, FTSG to left ear defect, and WLE of left lateral cheek/temporal area with rhomboid flap    Denies issues  Tenderness on left ear    Exam:  Alert, active, appropriate  Erythema and chondritis at left ear with ballooning of conchal skin and edema. Purulence at conchal bowl near root of helix. Scabbing and superficial necrosis of skin graft debrided. Cultured. Cleaned with hydrogen peroxide  Well healed Rhomboid and upper lip site - retained suture removed.    Path:  WLE of lateral left cheek/temporal area Invasive SCCa. Tumor extended to deep margin, peripheral margins clear.       A/P  Infection of recipient skin graft site cultured today - it is likely not getting the adequate wound care because difficult location. And I suspect a portion of the skin graft has not survived but there is soft tissue coverage at this point. Will need wound care and antibiotics. Culture sent will call oral abx. Begin Silvadene. FU with me in 1 week.    Deep margin of lateral canthal / upper cheek  is positive. This would possibly extend to the region of frontal branch of facial nerve. I would like to have him see Dr. Reyes again because I would have him considered for Moh's excision and 2nrdary intention closure vs. Local tissue rearrangement if needed.

## 2018-03-23 LAB — BACTERIA SPEC AEROBE CULT: NORMAL

## 2018-03-23 RX ORDER — CIPROFLOXACIN 500 MG/1
500 TABLET ORAL 2 TIMES DAILY
Qty: 20 TABLET | Refills: 0 | Status: SHIPPED | OUTPATIENT
Start: 2018-03-23 | End: 2018-04-02

## 2018-03-27 ENCOUNTER — OFFICE VISIT (OUTPATIENT)
Dept: OTOLARYNGOLOGY | Facility: CLINIC | Age: 83
End: 2018-03-27
Payer: MEDICARE

## 2018-03-27 VITALS — WEIGHT: 145.94 LBS | HEIGHT: 68 IN | BODY MASS INDEX: 22.12 KG/M2

## 2018-03-27 DIAGNOSIS — C44.219 BASAL CELL CARCINOMA (BCC) OF SKIN OF LEFT EAR: Primary | ICD-10-CM

## 2018-03-27 DIAGNOSIS — D49.0: ICD-10-CM

## 2018-03-27 DIAGNOSIS — C44.329 SQUAMOUS CELL CANCER OF SKIN OF LEFT CHEEK: ICD-10-CM

## 2018-03-27 PROCEDURE — 99999 PR PBB SHADOW E&M-EST. PATIENT-LVL III: CPT | Mod: PBBFAC,,, | Performed by: OTOLARYNGOLOGY

## 2018-03-27 PROCEDURE — 99024 POSTOP FOLLOW-UP VISIT: CPT | Mod: POP,,, | Performed by: OTOLARYNGOLOGY

## 2018-03-27 PROCEDURE — 99213 OFFICE O/P EST LOW 20 MIN: CPT | Mod: PBBFAC,PO | Performed by: OTOLARYNGOLOGY

## 2018-03-27 NOTE — PROGRESS NOTES
Thomas is here for a post-operative visit following Moh's reconstruction of left upper lip, nasolabial fold, FTSG to left ear defect, and WLE of left lateral cheek/temporal area with rhomboid flap    Denies issues  Tenderness and drainage improved  Started Cipro and Silvadene last week    Exam:  Alert, active, appropriate  Improving chondritis and erythema of left ear. No purulence. Debrided to healthy granulation tissue.  Well healed Rhomboid and upper lip site - retained suture removed.    Path:  WLE of lateral left cheek/temporal area Invasive SCCa. Tumor extended to deep margin, peripheral margins clear.     Cx pan sens Psuedo    A/P  Infection of recipient skin graft site improving with oral Cipro. Finish out course. continue local wound care. Fu 10 days    Deep margin of lateral canthal / upper cheek  is positive. This would possibly extend to the region of frontal branch of facial nerve. I would like to have him see Dr. Reyes again because I would have him considered for Moh's excision and 2nrdary intention closure vs. Local tissue rearrangement if needed.

## 2018-04-05 ENCOUNTER — OFFICE VISIT (OUTPATIENT)
Dept: OTOLARYNGOLOGY | Facility: CLINIC | Age: 83
End: 2018-04-05
Payer: MEDICARE

## 2018-04-05 VITALS
HEIGHT: 68 IN | BODY MASS INDEX: 22.38 KG/M2 | SYSTOLIC BLOOD PRESSURE: 159 MMHG | DIASTOLIC BLOOD PRESSURE: 62 MMHG | WEIGHT: 147.69 LBS

## 2018-04-05 DIAGNOSIS — C44.329 SQUAMOUS CELL CANCER OF SKIN OF LEFT CHEEK: Primary | ICD-10-CM

## 2018-04-05 DIAGNOSIS — C44.219 BASAL CELL CARCINOMA (BCC) OF SKIN OF LEFT EAR: ICD-10-CM

## 2018-04-05 PROCEDURE — 99213 OFFICE O/P EST LOW 20 MIN: CPT | Mod: PBBFAC,PO | Performed by: OTOLARYNGOLOGY

## 2018-04-05 PROCEDURE — 99999 PR PBB SHADOW E&M-EST. PATIENT-LVL III: CPT | Mod: PBBFAC,,, | Performed by: OTOLARYNGOLOGY

## 2018-04-05 PROCEDURE — 99024 POSTOP FOLLOW-UP VISIT: CPT | Mod: POP,,, | Performed by: OTOLARYNGOLOGY

## 2018-04-05 NOTE — PROGRESS NOTES
Thomas is here for a post-operative visit following Moh's reconstruction of left upper lip, nasolabial fold, FTSG to left ear defect, and WLE of left lateral cheek/temporal area with rhomboid flap    Denies issues  Tenderness and drainage improved  Completed antibiotics    Exam:  Alert, active, appropriate  Improving chondritis and erythema of left ear. Scant purulence opened with debridement. Wound is granulating nicely.  Well healed Rhomboid and upper lip site - retained sutures removed.    Path:  WLE of lateral left cheek/temporal area Invasive SCCa. Tumor extended to deep margin, peripheral margins clear.     Cx pan sens Psuedo    A/P  Infection of recipient skin graft site improving with oral Cipro. Completed course. Granulating and healing nicely  Fu 1 month with me and Eric    Deep margin of lateral canthal / upper cheek  is positive. This would possibly extend to the region of frontal branch of facial nerve. I would like to have him see Dr. Reyes again because I would have him considered for Moh's excision and 2nrdary intention closure vs. Local tissue rearrangement if needed.

## 2018-05-08 ENCOUNTER — OFFICE VISIT (OUTPATIENT)
Dept: DERMATOLOGY | Facility: CLINIC | Age: 83
End: 2018-05-08
Payer: MEDICARE

## 2018-05-08 ENCOUNTER — OFFICE VISIT (OUTPATIENT)
Dept: OTOLARYNGOLOGY | Facility: CLINIC | Age: 83
End: 2018-05-08
Payer: MEDICARE

## 2018-05-08 VITALS — WEIGHT: 144.63 LBS | HEIGHT: 68 IN | BODY MASS INDEX: 21.92 KG/M2 | TEMPERATURE: 99 F

## 2018-05-08 DIAGNOSIS — C44.329 SQUAMOUS CELL CARCINOMA OF SKIN OF LEFT TEMPLE: Primary | ICD-10-CM

## 2018-05-08 DIAGNOSIS — C44.219 BASAL CELL CARCINOMA (BCC) OF SKIN OF LEFT EAR: ICD-10-CM

## 2018-05-08 DIAGNOSIS — C44.329 SQUAMOUS CELL CANCER OF SKIN OF LEFT CHEEK: Primary | ICD-10-CM

## 2018-05-08 PROCEDURE — 99999 PR PBB SHADOW E&M-EST. PATIENT-LVL III: CPT | Mod: PBBFAC,,, | Performed by: OTOLARYNGOLOGY

## 2018-05-08 PROCEDURE — 99213 OFFICE O/P EST LOW 20 MIN: CPT | Mod: S$PBB,,, | Performed by: DERMATOLOGY

## 2018-05-08 PROCEDURE — 99024 POSTOP FOLLOW-UP VISIT: CPT | Mod: POP,,, | Performed by: OTOLARYNGOLOGY

## 2018-05-08 PROCEDURE — 99999 PR PBB SHADOW E&M-EST. PATIENT-LVL II: CPT | Mod: PBBFAC,,, | Performed by: DERMATOLOGY

## 2018-05-08 PROCEDURE — 99212 OFFICE O/P EST SF 10 MIN: CPT | Mod: PBBFAC,PO | Performed by: DERMATOLOGY

## 2018-05-08 PROCEDURE — 99213 OFFICE O/P EST LOW 20 MIN: CPT | Mod: PBBFAC,27,PO | Performed by: OTOLARYNGOLOGY

## 2018-05-08 NOTE — PROGRESS NOTES
Thomas is here for a post-operative visit following Moh's reconstruction of left upper lip, nasolabial fold, FTSG to left ear defect, and WLE of left lateral cheek/temporal area with rhomboid flap    Denies issues  Tenderness and drainage improved  Completed antibiotics    Exam:  Alert, active, appropriate  Well healed FTSG to helix and conchal bowl with mild contraction , no narrowing of EAC  Well healed Rhomboid and upper lip site - retained sutures removed. Small area of induration at superior aspect of rhomboid with no skin changes.     Path:  WLE of lateral left cheek/temporal area Invasive SCCa. Tumor extended to deep margin, peripheral margins clear.     A/P  Healed ear following Moh's   Has positive margin at deep aspect of upper cheek. Possibly recurrent tumor in area. I'd like to have Eric take a look to see if we will watch for more apparent tumor growth or just go in now for moh's excision to clear margins.   Fu 3 months with me or sooner depending on above recs.

## 2018-05-08 NOTE — PROGRESS NOTES
ALLERGIES:  Patient has no known allergies.    CHIEF COMPLAINT:  This 89 y.o. 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 Consultation requested by Sherwin Alatorre MD.    HISTORY OF PRESENT ILLNESS:   Location: left temple  Quality: somewhat firm  Context: status post excision by Dr. Alatorre; path showed residual squamous cell carcinoma to the deep margin; pathology accession #[id number], [facility]     Prior Treatment: excision by Dr. Alatorre   See also the handwritten notes/diagrams scanned to chart for additional details.    Defibrillator: No  Pacemaker: No  Artificial heart valves: No  Artificial joints: No     REVIEW OF SYSTEMS:   General: general health good  Skin: He is status post Mohs' surgery to a basal cell carcinoma on the left ear and a basal cell carcinoma on the left upper lip, with subsequent repair by Dr. Alatorre; Dr. Alatorre excise the lesion on the left temple at the same time, but unfortunately the deep margin was positive; see previous notes  CV: has no hypertension, no artificial valves, has no chest pain  Resp: has no shortness of breath  Endo: has no diabetes  Hem/Lymph: not taking prescribed anticoagulants, has no easy bruising/bleeding  Allergy/Immuno: has no allergies as noted above  GI: has no history of hepatitis  MS: as noted above     PAST MEDICAL HISTORY:  Past Medical History:   Diagnosis Date    Cancer     face    Hearing aid worn     Hearing loss     left       PAST SURGICAL HISTORY:  Past Surgical History:   Procedure Laterality Date    CATARACT EXTRACTION W/  INTRAOCULAR LENS IMPLANT      MULTIPLE TOOTH EXTRACTIONS      only sedation in lefe    PROSTATE BIOPSY          SOCIAL HISTORY:  Dependencies: smoking status as noted below  Social History   Substance Use Topics    Smoking status: Never Smoker    Smokeless tobacco: Never  Used    Alcohol use No       PERTINENT MEDICATIONS:  See medications list.    Current Outpatient Prescriptions:     silver sulfADIAZINE 1% (SILVADENE) 1 % cream, Apply topically once daily. Apply to left ear after cleaning with hydrogen peroxide, Disp: 400 g, Rfl: 1    ALLERGIES:  Patient has no known allergies.    EXAM:  See also the handwritten notes/diagrams scanned to chart for additional details.  Constitutional  General appearance: well-developed, well-nourished, well-kempt older white male    Eyes  Inspection of conjunctivae and lids reveals no abnormalities; sclerae anicteric  Neurologic/Psychiatric  Alert,  normal orientation to time, place, person  Normal mood and affect with no evidence of depression, anxiety, agitation  Skin: see photo(s)  Head: background marked solar damage to exposed areas of skin; the sites of recent surgery on his left ear and left upper lip are well healed; there is a well-healed rhombic transposition flap at the site on the left temple; The flap is somewhat   indurated to the superior portion, although this is of uncertain significance    ASSESSMENT: biopsy-proven, 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 In particular, the discussion included, but was not limited to, the following:    One alternative at this point would be to defer further treatment and observe the lesion. With small skin cancers of this kind, it is possible that a biopsy can be sufficient to definitively treat a small skin cancer of this kind. Alternatively, some skin  cancers are slow growing and do not require immediate treatment. The potential advantage of this choice would be to avoid the need for possibly unnecessary additional surgery. Among the potential disadvantages of this would be the possibility of enlargement of the lesion, more extensive spread of the lesion or recurrence at a later date, which might necessitate a larger and more complex surgery.    Radiation treatment can be an effective treatment for this type of skin cancer. The usual course of treatment is every weekday for several weeks. Local irritation will result from treatment, although no systemic side effects are expected. The potential advantage of radiation treatment is that it avoids the need for surgery. Among the disadvantages of radiation treatment are the length of treatment, the local inflammatory response, the absence of pathologic confirmation of the removal of the skin cancer, a possible increased risk of additional skin cancer in the treated area in later years, and a somewhat increased risk of recurrence at a later date.     Excisional surgery can be an effective treatment for this type of skin cancer. This would involve excision of the lesion with margin evaluation by submitting the specimen to a pathologist for either immediate marginal assessment via frozen section processing, or delayed marginal assessment by fixed-tissue processing. The potential advantage of this technique is that it offers a way of treating the lesion with some degree of histologic confirmation of tumor removal. Among the disadvantages of this treatment are the possible need for re-excision if marginal involvement is identified, a somewhat greater likelihood of recurrence as compared to Mohs' surgery because of the less comprehensive margin evaluation inherent in the technique, and the general potential risks of surgery, including allergic reactions to the anesthetic and other materials used, infection, injury to nerves  in the area with consequent loss of sensation or muscle function, and scarring or distortion of surrounding structures.    Mohs' surgery is a very effective treatment for this type of skin cancer. The potential advantage of Mohs' surgery is that this technique offers the greatest possible certainty of knowing that the skin cancer has been completely removed, with the removal of the least amount of normal tissue. The potential disadvantages of Mohs' surgery include the duration of the surgery, the possible need for a separate surgery for reconstruction following tumor removal, and scarring as a result. In addition, general potential risks of surgery as noted above also apply to treatment via Mohs' surgery.    In light of the incompletely-excised nature of this tumor,  Mohs' micrographic surgery was thought to be the most appropriate management choice, and this diagnosis is appropriate for treatment by Mohs' micrographic surgery.     We also discussed options for repair of the site to follow tumor removal via Mohs' surgery, including the participation of a plastic surgeon to reconstruct the resultant wound. Given the location, the anticipated size and potential complexity of the defect to follow tumor removal via Mohs' surgery, reconstruction will be coordinated with .     Sufficient time was available for questions, and all questions were answered to his satisfaction. He fully understands the aims, risks, alternatives, and possible complications, and has elected to proceed with the surgery, and verbally consented to do so. The procedure will be scheduled in the near future.    Routine pre-op instructions were given to him.    --------------------------------------  Note: Some or all of this note may have been generated using voice recognition software. There may be voice recognition errors including grammatical and/or spelling errors found in the text. Attempts were made to correct these errors prior to  signature.

## 2018-05-14 ENCOUNTER — TELEPHONE (OUTPATIENT)
Dept: DERMATOLOGY | Facility: CLINIC | Age: 83
End: 2018-05-14

## 2018-05-14 NOTE — TELEPHONE ENCOUNTER
----- Message from Tasha Recio sent at 5/14/2018 10:01 AM CDT -----  Type: Needs Medical Advice    Who Called:  patient  Symptoms (please be specific): Felicity  How long has patient had these symptoms:  Felicity  Pharmacy name and phone #:  [facility]  Best Call Back Number: [phone] (home)     Additional Information: Wanted to reschedule an appt, stating he was to be contacted regarding reschedule, haven't received a call

## 2018-08-01 NOTE — TELEPHONE ENCOUNTER
----- Message from Mely Rubin sent at 3/2/2018  9:29 AM CST -----  Ne (Friend)calling for patient to confirm follow up appointment after procedure/stated discharge paperwork states Monday, March 5th/not showing in system/please call back at [phone] to advise.  
Spoke with Ne (friend), scheduled post-op appt for 3/6/18. Ne verbalized understanding of date, time and location.  
fall

## 2018-11-25 PROBLEM — R60.0 BILATERAL LEG EDEMA: Status: ACTIVE | Noted: 2018-11-25

## 2018-11-25 PROBLEM — M79.89 LEG SWELLING: Status: ACTIVE | Noted: 2018-11-25

## 2018-11-25 PROBLEM — R74.8 ELEVATED ALKALINE PHOSPHATASE LEVEL: Status: ACTIVE | Noted: 2018-11-25

## 2018-11-25 PROBLEM — I50.9 ACUTE CONGESTIVE HEART FAILURE: Status: ACTIVE | Noted: 2018-11-25

## 2018-11-25 PROBLEM — R63.0 ANOREXIA: Status: ACTIVE | Noted: 2018-11-25

## 2018-11-26 PROBLEM — I35.0 MILD AORTIC STENOSIS: Status: ACTIVE | Noted: 2018-11-26

## 2018-11-26 PROBLEM — I05.0 MILD MITRAL STENOSIS: Status: ACTIVE | Noted: 2018-11-26

## 2018-11-26 PROBLEM — I50.20 SYSTOLIC CHF WITH REDUCED LEFT VENTRICULAR FUNCTION, NYHA CLASS 2: Status: ACTIVE | Noted: 2018-11-26

## 2018-12-10 ENCOUNTER — OFFICE VISIT (OUTPATIENT)
Dept: CARDIOLOGY | Facility: CLINIC | Age: 83
End: 2018-12-10
Payer: MEDICARE

## 2018-12-10 VITALS
WEIGHT: 137.81 LBS | HEIGHT: 66 IN | SYSTOLIC BLOOD PRESSURE: 128 MMHG | DIASTOLIC BLOOD PRESSURE: 58 MMHG | HEART RATE: 78 BPM | BODY MASS INDEX: 22.15 KG/M2

## 2018-12-10 DIAGNOSIS — I50.20 SYSTOLIC CHF WITH REDUCED LEFT VENTRICULAR FUNCTION, NYHA CLASS 2: Primary | ICD-10-CM

## 2018-12-10 DIAGNOSIS — I48.0 PAF (PAROXYSMAL ATRIAL FIBRILLATION): ICD-10-CM

## 2018-12-10 DIAGNOSIS — I35.0 MILD AORTIC STENOSIS: ICD-10-CM

## 2018-12-10 DIAGNOSIS — I44.7 LBBB (LEFT BUNDLE BRANCH BLOCK): ICD-10-CM

## 2018-12-10 PROCEDURE — 99214 OFFICE O/P EST MOD 30 MIN: CPT | Mod: S$PBB,,, | Performed by: INTERNAL MEDICINE

## 2018-12-10 PROCEDURE — 99999 PR PBB SHADOW E&M-EST. PATIENT-LVL III: CPT | Mod: PBBFAC,,, | Performed by: INTERNAL MEDICINE

## 2018-12-10 PROCEDURE — 99213 OFFICE O/P EST LOW 20 MIN: CPT | Mod: PBBFAC,PO | Performed by: INTERNAL MEDICINE

## 2018-12-10 NOTE — LETTER
December 10, 2018      Diana Bay MD  [address]  [facility] [address]           [facility]  [address] [facility] [address] [place] [address]  Phone: [phone]          Patient: Thomas Jennings   MR Number: [id number]   YOB: 1922   Date of Visit: 12/10/2018       Dear Dr. Diana Bay:    Thank you for referring Thomas Jennings to me for evaluation. Attached you will find relevant portions of my assessment and plan of care.    If you have questions, please do not hesitate to call me. I look forward to following Thomas Jennings along with you.    Sincerely,    Viral Heaton MD    Enclosure  CC:  No Recipients    If you would like to receive this communication electronically, please contact [email or web address] or [phone] to request more information on [employer] access.    For providers and/or their staff who would like to refer a patient to [facility], please contact us through our one-stop-shop provider referral line, [facility], at [phone].    If you feel you have received this communication in error or would no longer like to receive these types of communications, please e-mail [email or web address]

## 2018-12-10 NOTE — PROGRESS NOTES
Subjective:    Patient ID:  Thomas Jennings is a 89 y.o. male who presents for evaluation of Congestive Heart Failure (hospital follow up); Atrial Fibrillation; and Aortic Stenosis      Pt here for f/u after a hospital stay for fluid overload and CHF. He went to the ER with c/o LE swelling which he thought was due to ant bites. His BNP was elevated and IV diuretics resolved his edema. He was feeling fine and asked to be discharged the next day. Echo showed EF mildly reduced at 45% with mild AS. Telemetry showed several short AF episodes. Several of the discharge meds did not transmit to the pharmacy at d/c. The only med he has taken since discharge has been the furosemide which he has since stopped. Presently he reports he is feeling well and back to normal.         Review of Systems   Constitution: Negative for weight gain and weight loss.   HENT: Negative.    Eyes: Negative.    Cardiovascular: Negative for chest pain, claudication, cyanosis, dyspnea on exertion, irregular heartbeat, leg swelling, near-syncope, orthopnea (no PND), palpitations and syncope.   Respiratory: Negative for cough, hemoptysis, shortness of breath and snoring.    Endocrine: Negative.    Skin: Negative.    Musculoskeletal: Negative for joint pain, muscle cramps, muscle weakness and myalgias.   Gastrointestinal: Negative for diarrhea, hematemesis, nausea and vomiting.   Genitourinary: Negative.    Neurological: Negative for dizziness, focal weakness, light-headedness, loss of balance, numbness, paresthesias and seizures.   Psychiatric/Behavioral: Negative.         Objective:    Physical Exam   Constitutional: He is oriented to person, place, and time. He appears well-developed and well-nourished.   HENT:   Mouth/Throat: Oropharynx is clear and moist.   Eyes: Pupils are equal, round, and reactive to light.   Neck: Normal range of motion. No thyromegaly present.   Cardiovascular: Normal rate, regular rhythm, S1 normal, S2 normal, intact distal  pulses and normal pulses.  No extrasystoles are present. PMI is not displaced. Exam reveals no friction rub.   Murmur heard.   Harsh midsystolic murmur is present with a grade of 2/6 at the upper right sternal border.  Pulmonary/Chest: Effort normal and breath sounds normal. He has no wheezes. He has no rales. He exhibits no tenderness.   Abdominal: Soft. Bowel sounds are normal. He exhibits no distension and no mass. There is no tenderness.   Musculoskeletal: Normal range of motion. He exhibits no edema.   Neurological: He is alert and oriented to person, place, and time.   Skin: Skin is warm and dry.   Vitals reviewed.      Test(s) Reviewed  I have reviewed the following in detail:  [] Stress test   [] Angiography   [x] Echocardiogram   [] Labs   [x] Other:  ECG; Hospital records       Assessment:       1. Systolic CHF with reduced left ventricular function, NYHA class 2 - EF 45%    2. PAF (paroxysmal atrial fibrillation) - short runs seen on Telemetry    3. Mild aortic stenosis    4. LBBB (left bundle branch block)         Plan:       We discussed his hospital stay and his meds.  We discussed that he is not particularly interested in more medicines or invasive tests at his age.  He presently feels well   We discussed getting a PCP for his general medical care. He has not had one as he says he never gets sick.  Will have him f/u in 2 months

## 2019-02-11 ENCOUNTER — OFFICE VISIT (OUTPATIENT)
Dept: CARDIOLOGY | Facility: CLINIC | Age: 84
End: 2019-02-11
Payer: MEDICARE

## 2019-02-11 VITALS
HEART RATE: 71 BPM | BODY MASS INDEX: 22.6 KG/M2 | DIASTOLIC BLOOD PRESSURE: 60 MMHG | HEIGHT: 66 IN | WEIGHT: 140.63 LBS | SYSTOLIC BLOOD PRESSURE: 130 MMHG

## 2019-02-11 DIAGNOSIS — R60.0 BILATERAL LEG EDEMA: ICD-10-CM

## 2019-02-11 DIAGNOSIS — I50.20 SYSTOLIC CHF WITH REDUCED LEFT VENTRICULAR FUNCTION, NYHA CLASS 2: Primary | ICD-10-CM

## 2019-02-11 DIAGNOSIS — I48.0 PAF (PAROXYSMAL ATRIAL FIBRILLATION): ICD-10-CM

## 2019-02-11 DIAGNOSIS — I35.0 MILD AORTIC STENOSIS: ICD-10-CM

## 2019-02-11 DIAGNOSIS — I44.7 LBBB (LEFT BUNDLE BRANCH BLOCK): ICD-10-CM

## 2019-02-11 PROCEDURE — 99214 OFFICE O/P EST MOD 30 MIN: CPT | Mod: S$PBB,,, | Performed by: INTERNAL MEDICINE

## 2019-02-11 PROCEDURE — 99214 PR OFFICE/OUTPT VISIT, EST, LEVL IV, 30-39 MIN: ICD-10-PCS | Mod: S$PBB,,, | Performed by: INTERNAL MEDICINE

## 2019-02-11 PROCEDURE — 99213 OFFICE O/P EST LOW 20 MIN: CPT | Mod: PBBFAC,PO | Performed by: INTERNAL MEDICINE

## 2019-02-11 PROCEDURE — 99999 PR PBB SHADOW E&M-EST. PATIENT-LVL III: CPT | Mod: PBBFAC,,, | Performed by: INTERNAL MEDICINE

## 2019-02-11 PROCEDURE — 99999 PR PBB SHADOW E&M-EST. PATIENT-LVL III: ICD-10-PCS | Mod: PBBFAC,,, | Performed by: INTERNAL MEDICINE

## 2019-02-11 RX ORDER — FUROSEMIDE 20 MG/1
20 TABLET ORAL DAILY
Qty: 30 TABLET | Refills: 11 | Status: SHIPPED | OUTPATIENT
Start: 2019-02-11 | End: 2019-03-06

## 2019-02-11 RX ORDER — CARVEDILOL 3.12 MG/1
3.12 TABLET ORAL 2 TIMES DAILY
Qty: 60 TABLET | Refills: 11 | Status: SHIPPED | OUTPATIENT
Start: 2019-02-11 | End: 2020-02-11

## 2019-02-11 RX ORDER — POTASSIUM CHLORIDE 20 MEQ/1
20 TABLET, EXTENDED RELEASE ORAL DAILY
Qty: 30 TABLET | Refills: 11 | Status: SHIPPED | OUTPATIENT
Start: 2019-02-11 | End: 2019-03-06

## 2019-02-11 NOTE — PROGRESS NOTES
Subjective:    Patient ID:  Thomas Jennings is a 89 y.o. male who presents for follow-up of Congestive Heart Failure (2 month f/u) and Atrial Fibrillation      Pt here for f/u. He reports he is doing well and feels good - reports no symptoms at present. He has a friend with him today and she says he has not been taking any of the medicines on his list.         Review of Systems   Constitution: Negative for weight gain and weight loss.   HENT: Negative.    Eyes: Negative.    Cardiovascular: Positive for leg swelling. Negative for chest pain, claudication, cyanosis, dyspnea on exertion, irregular heartbeat, near-syncope, orthopnea (no PND) and palpitations.   Respiratory: Negative for cough, hemoptysis, shortness of breath and snoring.    Endocrine: Negative.    Skin: Negative.    Musculoskeletal: Negative for joint pain, muscle cramps, muscle weakness and myalgias.   Gastrointestinal: Negative for diarrhea, hematemesis, nausea and vomiting.   Genitourinary: Negative.    Neurological: Negative for dizziness, focal weakness, light-headedness, loss of balance, numbness, paresthesias and seizures.   Psychiatric/Behavioral: Negative.         Objective:    Physical Exam   Constitutional: He is oriented to person, place, and time. He appears well-developed and well-nourished.   HENT:   Mouth/Throat: Oropharynx is clear and moist.   Eyes: Pupils are equal, round, and reactive to light.   Neck: Normal range of motion. No thyromegaly present.   Cardiovascular: Normal rate, regular rhythm, S1 normal, S2 normal, intact distal pulses and normal pulses.  No extrasystoles are present. PMI is not displaced. Exam reveals no friction rub.   Murmur heard.   Harsh systolic murmur is present with a grade of 2/6 at the upper right sternal border.   Blowing holosystolic murmur of grade 2/6 is also present at the apex.  Pulmonary/Chest: Effort normal and breath sounds normal. He has no wheezes. He has no rales. He exhibits no tenderness.    Abdominal: Soft. Bowel sounds are normal. He exhibits no distension and no mass. There is no tenderness.   Musculoskeletal: Normal range of motion. He exhibits edema (2+ to the knees).   Neurological: He is alert and oriented to person, place, and time.   Skin: Skin is warm and dry.   Vitals reviewed.      Test(s) Reviewed  I have reviewed the following in detail:  [] Stress test   [] Angiography   [x] Echocardiogram   [] Labs   [] Other:         Assessment:       1. Systolic CHF with reduced left ventricular function, NYHA class 2 - EF 45%    2. PAF (paroxysmal atrial fibrillation) - short runs seen on Telemetry    3. Mild aortic stenosis    4. LBBB (left bundle branch block)    5. Bilateral leg edema         Plan:       Refill coreg, furosemide and K+  We discussed his cardiac issues and he has agreed to take the meds  He will f/u in 2-3 months

## 2019-03-04 ENCOUNTER — TELEPHONE (OUTPATIENT)
Dept: CARDIOLOGY | Facility: CLINIC | Age: 84
End: 2019-03-04

## 2019-03-04 ENCOUNTER — OFFICE VISIT (OUTPATIENT)
Dept: URGENT CARE | Facility: CLINIC | Age: 84
End: 2019-03-04
Payer: MEDICARE

## 2019-03-04 VITALS
DIASTOLIC BLOOD PRESSURE: 60 MMHG | BODY MASS INDEX: 22.5 KG/M2 | HEIGHT: 66 IN | OXYGEN SATURATION: 96 % | RESPIRATION RATE: 16 BRPM | WEIGHT: 140 LBS | HEART RATE: 61 BPM | SYSTOLIC BLOOD PRESSURE: 122 MMHG | TEMPERATURE: 98 F

## 2019-03-04 DIAGNOSIS — R60.0 PERIPHERAL EDEMA: Primary | ICD-10-CM

## 2019-03-04 PROCEDURE — 99203 OFFICE O/P NEW LOW 30 MIN: CPT | Mod: S$GLB,,, | Performed by: PHYSICIAN ASSISTANT

## 2019-03-04 PROCEDURE — 99203 PR OFFICE/OUTPT VISIT, NEW, LEVL III, 30-44 MIN: ICD-10-PCS | Mod: S$GLB,,, | Performed by: PHYSICIAN ASSISTANT

## 2019-03-04 NOTE — PATIENT INSTRUCTIONS
Compression stockings daily  Decrease salt intake  Follow up with Cardiology  Raise legs above heart when possible.     Any chest pain, shortness of breath, wheezing, dizziness, ie any other symptoms, go to ED.    You must understand that you've received an Urgent Care treatment only and that you may be released before all your medical problems are known or treated. You, the patient, will arrange for follow up care as instructed.  Follow up with your PCP or specialty clinic as directed in the next 1-2 weeks if not improved or as needed.  You can call [phone] to schedule an appointment with the appropriate provider.  If your condition worsens we recommend that you receive another evaluation at the emergency room immediately or contact your primary medical clinics after hours call service to discuss your concerns.  Please return here or go to the Emergency Department for any concerns or worsening of condition.

## 2019-03-04 NOTE — PROGRESS NOTES
"Subjective:       Patient ID: Thomas Jennings is a 89 y.o. male.    Vitals:  height is 5' 6" (1.676 m) and weight is 63.5 kg (140 lb). His oral temperature is 97.7 °F (36.5 °C). His blood pressure is 122/60 and his pulse is 61. His respiration is 16 and oxygen saturation is 96%.     Chief Complaint: Leg Swelling    Both legs swelling for about 4 months. Sometimes legs hurt      Edema   This is a new problem. The current episode started more than 1 month ago. The problem occurs intermittently. The problem has been unchanged. Pertinent negatives include no arthralgias, chest pain, chills, congestion, coughing, fatigue, fever, headaches, myalgias, nausea, rash, sore throat, vertigo or vomiting. The symptoms are aggravated by walking.       Constitution: Negative for chills, fatigue and fever.   HENT: Negative for congestion and sore throat.    Neck: Negative for painful lymph nodes.   Cardiovascular: Positive for leg swelling. Negative for chest pain.   Eyes: Negative for double vision and blurred vision.   Respiratory: Negative for cough and shortness of breath.    Gastrointestinal: Negative for nausea, vomiting and diarrhea.   Genitourinary: Negative for dysuria, frequency and urgency.   Musculoskeletal: Negative for joint pain, muscle cramps and muscle ache.   Skin: Negative for color change, pale, rash and erythema.   Allergic/Immunologic: Negative for seasonal allergies.   Neurological: Negative for dizziness, history of vertigo, light-headedness, passing out and headaches.   Hematologic/Lymphatic: Negative for swollen lymph nodes, easy bruising/bleeding and history of blood clots. Does not bruise/bleed easily.   Psychiatric/Behavioral: Negative for nervous/anxious, sleep disturbance and depression. The patient is not nervous/anxious.        Objective:      Physical Exam   Constitutional: He is oriented to person, place, and time. He appears well-developed and well-nourished.   HENT:   Head: Normocephalic and " atraumatic. Head is without abrasion, without contusion and without laceration.   Right Ear: External ear normal.   Left Ear: External ear normal.   Nose: Nose normal.   Mouth/Throat: Oropharynx is clear and moist.   Eyes: Conjunctivae, EOM and lids are normal. Pupils are equal, round, and reactive to light.   Neck: Trachea normal, full passive range of motion without pain and phonation normal. Neck supple.   Cardiovascular: Normal rate and regular rhythm.   Murmur heard.   Systolic murmur is present.  3 + pitting edema bilaterally   Pulmonary/Chest: Effort normal and breath sounds normal. No stridor. No respiratory distress.   Musculoskeletal: Normal range of motion.   Neurological: He is alert and oriented to person, place, and time.   Normal sensation to light touch bilateral lower extremities   Skin: Skin is warm, dry and intact. Capillary refill takes less than 2 seconds. No abrasion, no bruising, no burn, no ecchymosis, no laceration, no lesion and no rash noted. No erythema.   Psychiatric: He has a normal mood and affect. His speech is normal and behavior is normal. Judgment and thought content normal. Cognition and memory are normal.   Nursing note and vitals reviewed.      Assessment:       1. Peripheral edema        Plan:         Peripheral edema    Other orders      Patient denies any shortness of breath, wheezing, chest pain, lower leg numbness.  He is complaining continued swelling, but no worsening of symptoms.  Recently began to be compliant with medications after cardiology visit on 02/11.  Patient has not been wearing compression stockings.  Family member who was present with patient states that he eats prepared meals that are likely high in sodium.  We discussed decreasing sodium intake.  We also discussed just elevating legs above her heart as often as possible.  We have attempted to contact Cardiology office for follow-up.  We were unable to contact anybody there.  I have advised patient to call  his cardiologist's office this afternoon and on Wednesday to try to get appointment for follow-up.  Patient and family member state understanding and agree with this plan. I discussed this case with my supervising provider who agrees with the plan also.    Compression stockings daily  Decrease salt intake  Follow up with Cardiology  Raise legs above heart when possible.     Any chest pain, shortness of breath, wheezing, dizziness, ie any other symptoms, go to ED.    You must understand that you've received an Urgent Care treatment only and that you may be released before all your medical problems are known or treated. You, the patient, will arrange for follow up care as instructed.  Follow up with your PCP or specialty clinic as directed in the next 1-2 weeks if not improved or as needed.  You can call [phone] to schedule an appointment with the appropriate provider.  If your condition worsens we recommend that you receive another evaluation at the emergency room immediately or contact your primary medical clinics after hours call service to discuss your concerns.  Please return here or go to the Emergency Department for any concerns or worsening of condition.

## 2019-03-04 NOTE — TELEPHONE ENCOUNTER
----- Message from Chang Hardenan sent at 3/4/2019 12:12 PM CST -----  Contact: [facility]/ Urgent Care Clinic   Type:  Same Day Appointment Request    Caller is requesting a same day appointment.  Caller declined first available appointment listed below.      Name of Caller: [facility] / Urgent Care Clinic   When is the first available appointment?  Friday   Symptoms: leg swelling   Best Call Back Number: [phone]

## 2019-03-06 ENCOUNTER — OFFICE VISIT (OUTPATIENT)
Dept: CARDIOLOGY | Facility: CLINIC | Age: 84
End: 2019-03-06
Payer: MEDICARE

## 2019-03-06 VITALS
BODY MASS INDEX: 24.41 KG/M2 | WEIGHT: 151.88 LBS | HEART RATE: 57 BPM | DIASTOLIC BLOOD PRESSURE: 49 MMHG | SYSTOLIC BLOOD PRESSURE: 107 MMHG | HEIGHT: 66 IN

## 2019-03-06 DIAGNOSIS — I35.0 MILD AORTIC STENOSIS: ICD-10-CM

## 2019-03-06 DIAGNOSIS — I44.7 LBBB (LEFT BUNDLE BRANCH BLOCK): ICD-10-CM

## 2019-03-06 DIAGNOSIS — R60.0 BILATERAL LEG EDEMA: ICD-10-CM

## 2019-03-06 DIAGNOSIS — I50.20 SYSTOLIC CHF WITH REDUCED LEFT VENTRICULAR FUNCTION, NYHA CLASS 2: Primary | ICD-10-CM

## 2019-03-06 PROCEDURE — 99999 PR PBB SHADOW E&M-EST. PATIENT-LVL III: ICD-10-PCS | Mod: PBBFAC,,, | Performed by: INTERNAL MEDICINE

## 2019-03-06 PROCEDURE — 99213 OFFICE O/P EST LOW 20 MIN: CPT | Mod: PBBFAC,PO | Performed by: INTERNAL MEDICINE

## 2019-03-06 PROCEDURE — 99214 OFFICE O/P EST MOD 30 MIN: CPT | Mod: S$PBB,,, | Performed by: INTERNAL MEDICINE

## 2019-03-06 PROCEDURE — 99999 PR PBB SHADOW E&M-EST. PATIENT-LVL III: CPT | Mod: PBBFAC,,, | Performed by: INTERNAL MEDICINE

## 2019-03-06 PROCEDURE — 99214 PR OFFICE/OUTPT VISIT, EST, LEVL IV, 30-39 MIN: ICD-10-PCS | Mod: S$PBB,,, | Performed by: INTERNAL MEDICINE

## 2019-03-06 RX ORDER — FUROSEMIDE 20 MG/1
20 TABLET ORAL 2 TIMES DAILY
Qty: 60 TABLET | Refills: 11 | Status: ON HOLD | OUTPATIENT
Start: 2019-03-06 | End: 2019-06-26 | Stop reason: HOSPADM

## 2019-03-06 RX ORDER — POTASSIUM CHLORIDE 20 MEQ/1
20 TABLET, EXTENDED RELEASE ORAL 2 TIMES DAILY
Qty: 60 TABLET | Refills: 11 | Status: ON HOLD | OUTPATIENT
Start: 2019-03-06 | End: 2019-06-26 | Stop reason: HOSPADM

## 2019-03-06 NOTE — PROGRESS NOTES
Subjective:    Patient ID:  Thomas Jennings is a 89 y.o. male who presents for follow-up of Edema (urgent care f/u went to urgent care for legs swelling)      Pt seen 3 weeks ago and we restarted some of his meds including his furosemide. He still has leg swelling and went to urgent care 2 days ago. He says he is compliant with the medicines. Says his legs feel weak. Reportedly the swelling goes down if legs are raised and go down overnight.         Review of Systems   Constitution: Negative for weight gain and weight loss.   HENT: Negative.    Eyes: Negative.    Cardiovascular: Positive for leg swelling. Negative for chest pain, claudication, cyanosis, dyspnea on exertion, irregular heartbeat, near-syncope, orthopnea (no PND) and palpitations.   Respiratory: Negative for cough, hemoptysis, shortness of breath and snoring.    Endocrine: Negative.    Skin: Negative.    Musculoskeletal: Positive for muscle weakness (legs). Negative for joint pain, muscle cramps and myalgias.   Gastrointestinal: Negative for diarrhea, hematemesis, nausea and vomiting.   Genitourinary: Negative.    Neurological: Negative for dizziness, focal weakness, light-headedness, loss of balance, numbness, paresthesias and seizures.   Psychiatric/Behavioral: Negative.         Objective:    Physical Exam   Constitutional: He is oriented to person, place, and time. He appears well-developed and well-nourished.   HENT:   Mouth/Throat: Oropharynx is clear and moist.   Eyes: Pupils are equal, round, and reactive to light.   Neck: Normal range of motion. No thyromegaly present.   Cardiovascular: Normal rate, regular rhythm, S1 normal, S2 normal, intact distal pulses and normal pulses.  No extrasystoles are present. PMI is not displaced. Exam reveals no friction rub.   Murmur heard.   Harsh systolic murmur is present with a grade of 2/6 at the upper right sternal border.   Holosystolic murmur of grade 1/6 is also present at the apex.  Pulmonary/Chest:  Effort normal and breath sounds normal. He has no wheezes. He has no rales. He exhibits no tenderness.   Abdominal: Soft. Bowel sounds are normal. He exhibits no distension and no mass. There is no tenderness.   Musculoskeletal: Normal range of motion. He exhibits edema (1-2+).   Neurological: He is alert and oriented to person, place, and time.   Skin: Skin is warm and dry.   Vitals reviewed.        Assessment:       1. Systolic CHF with reduced left ventricular function, NYHA class 2 - EF 45%    2. Mild aortic stenosis    3. LBBB (left bundle branch block)    4. Bilateral leg edema         Plan:       LE edema has improved some with the restart of the furosemide  Will increase his furosemide to 40 mg daily  We discussed the dependent nature of the swelling and to continue to keep legs elevated when sitting  He has a recall for f/u in May

## 2019-03-26 ENCOUNTER — TELEPHONE (OUTPATIENT)
Dept: CARDIOLOGY | Facility: CLINIC | Age: 84
End: 2019-03-26

## 2019-03-26 NOTE — TELEPHONE ENCOUNTER
Spoke to daughter, worked pt in on Thursday evening to see Dr Franks ----- Message from Kleber Marcos sent at 3/26/2019 11:08 AM CDT -----  Type:  Same Day Appointment Request    Caller is requesting a same day appointment.  Caller declined first available appointment listed below.      Name of Caller:  Alma Rosa Falcon (Daughter)  When is the first available appointment?  04/04  Symptoms:  Swollen legs  Best Call Back Number:  [phone]  Additional Information:   Patient of Dr. Heaton but caller wants patient to be seen by someone else

## 2019-05-10 ENCOUNTER — TELEPHONE (OUTPATIENT)
Dept: DERMATOLOGY | Facility: CLINIC | Age: 84
End: 2019-05-10

## 2019-05-10 NOTE — TELEPHONE ENCOUNTER
----- Message from Halle Moreno sent at 5/10/2019 12:17 PM CDT -----  Contact: Patient's friend- Ne  Type: Needs Medical Advice    Who Called: Patients friend- Ne  Symptoms (please be specific):  Spot on top of head and side of face  How long has patient had these symptoms:  c[facility]ace  Pharmacy name and phone #:  [email or web address]  Best Call Back Number: [phone]  Additional Information: requesting to schedule appointment with Dr. Reyes, established patient. Please call Ne at the number provided. Thank you!

## 2019-06-04 ENCOUNTER — OFFICE VISIT (OUTPATIENT)
Dept: DERMATOLOGY | Facility: CLINIC | Age: 84
End: 2019-06-04
Payer: MEDICARE

## 2019-06-04 VITALS — HEIGHT: 66 IN | BODY MASS INDEX: 24.27 KG/M2 | WEIGHT: 151 LBS

## 2019-06-04 DIAGNOSIS — D48.5 NEOPLASM OF UNCERTAIN BEHAVIOR OF SKIN: Primary | ICD-10-CM

## 2019-06-04 DIAGNOSIS — Z85.828 PERSONAL HISTORY OF MALIGNANT NEOPLASM OF SKIN: ICD-10-CM

## 2019-06-04 PROCEDURE — 11103 PR TANGENTIAL BIOPSY, SKIN, EA ADDTL LESION: ICD-10-PCS | Mod: 59,S$PBB,, | Performed by: DERMATOLOGY

## 2019-06-04 PROCEDURE — 11102 TANGNTL BX SKIN SINGLE LES: CPT | Mod: PBBFAC,PO | Performed by: DERMATOLOGY

## 2019-06-04 PROCEDURE — 99499 NO LOS: ICD-10-PCS | Mod: S$PBB,,, | Performed by: DERMATOLOGY

## 2019-06-04 PROCEDURE — 99999 PR PBB SHADOW E&M-EST. PATIENT-LVL III: ICD-10-PCS | Mod: PBBFAC,,, | Performed by: DERMATOLOGY

## 2019-06-04 PROCEDURE — 11103 TANGNTL BX SKIN EA SEP/ADDL: CPT | Mod: 59,PBBFAC,PO | Performed by: DERMATOLOGY

## 2019-06-04 PROCEDURE — 99213 OFFICE O/P EST LOW 20 MIN: CPT | Mod: PBBFAC,PO,25 | Performed by: DERMATOLOGY

## 2019-06-04 PROCEDURE — 11102 TANGNTL BX SKIN SINGLE LES: CPT | Mod: S$PBB,,, | Performed by: DERMATOLOGY

## 2019-06-04 PROCEDURE — 11103 TANGNTL BX SKIN EA SEP/ADDL: CPT | Mod: 59,S$PBB,, | Performed by: DERMATOLOGY

## 2019-06-04 PROCEDURE — 11102 PR TANGENTIAL BIOPSY, SKIN, SINGLE LESION: ICD-10-PCS | Mod: S$PBB,,, | Performed by: DERMATOLOGY

## 2019-06-04 PROCEDURE — 88305 TISSUE SPECIMEN TO PATHOLOGY, DERMATOLOGY: ICD-10-PCS | Mod: 26,,, | Performed by: PATHOLOGY

## 2019-06-04 PROCEDURE — 88305 TISSUE EXAM BY PATHOLOGIST: CPT | Mod: 59 | Performed by: PATHOLOGY

## 2019-06-04 PROCEDURE — 99999 PR PBB SHADOW E&M-EST. PATIENT-LVL III: CPT | Mod: PBBFAC,,, | Performed by: DERMATOLOGY

## 2019-06-04 PROCEDURE — 99499 UNLISTED E&M SERVICE: CPT | Mod: S$PBB,,, | Performed by: DERMATOLOGY

## 2019-06-04 NOTE — PROGRESS NOTES
Subjective:       Patient ID:  Thomas Jennings is a 89 y.o. male who presents for   Chief Complaint   Patient presents with    Lesion     89 y/o M presents for initial visit with his daughter for evaluation of a lesion on forehead x few weeks, non healing.  They have been applying Vaseline to it. Pt also has spot on his left temple x few months, bleeding, non healing. No prior tx.     Derm Hx: BCC on lip, L ear, L cheek.  Denies family h/o melanoma  .  Past Medical History:   Diagnosis Date    Acute congestive heart failure 11/25/2018    Cancer     face    Hearing aid worn     Hearing loss     left     Review of Systems   Constitutional: Negative for fever.   Skin: Positive for dry skin and wears hat. Negative for itching, rash, daily sunscreen use and activity-related sunscreen use.        Objective:    Physical Exam   Constitutional: He appears well-developed and well-nourished.   HENT:   Mouth/Throat: Lips normal.    Eyes: Lids are normal.    Neurological: He is alert and oriented to person, place, and time.   Psychiatric: He has a normal mood and affect.   Skin:   Areas Examined (abnormalities noted in diagram):   Scalp / Hair Palpated and Inspected  Head / Face Inspection Performed  Neck Inspection Performed  RUE Inspected  LUE Inspection Performed                          Diagram Legend     Erythematous scaling macule/papule c/w actinic keratosis       Vascular papule c/w angioma      Pigmented verrucoid papule/plaque c/w seborrheic keratosis      Yellow umbilicated papule c/w sebaceous hyperplasia      Irregularly shaped tan macule c/w lentigo     1-2 mm smooth white papules consistent with Milia      Movable subcutaneous cyst with punctum c/w epidermal inclusion cyst      Subcutaneous movable cyst c/w pilar cyst      Firm pink to brown papule c/w dermatofibroma      Pedunculated fleshy papule(s) c/w skin tag(s)      Evenly pigmented macule c/w junctional nevus     Mildly variegated pigmented,  slightly irregular-bordered macule c/w mildly atypical nevus      Flesh colored to evenly pigmented papule c/w intradermal nevus       Pink pearly papule/plaque c/w basal cell carcinoma      Erythematous hyperkeratotic cursted plaque c/w SCC      Surgical scar with no sign of skin cancer recurrence      Open and closed comedones      Inflammatory papules and pustules      Verrucoid papule consistent consistent with wart     Erythematous eczematous patches and plaques     Dystrophic onycholytic nail with subungual debris c/w onychomycosis     Umbilicated papule    Erythematous-base heme-crusted tan verrucoid plaque consistent with inflamed seborrheic keratosis     Erythematous Silvery Scaling Plaque c/w Psoriasis     See annotation      Assessment / Plan:      Pathology Orders:     Normal Orders This Visit    Tissue Specimen To Pathology, Dermatology     Questions:    Directional Terms:  Other(comment)    Clinical Information:  r/o SCC vs BCC vs other    Specific Site:  L temple    Tissue Specimen To Pathology, Dermatology     Questions:    Directional Terms:  Other(comment)    Clinical Information:  r/o SCC    Specific Site:  L frontal scalp    Tissue Specimen To Pathology, Dermatology     Questions:    Directional Terms:  Other(comment)    Clinical Information:  r/o BCC    Specific Site:  R nasal sidewall (adjacent to scar)        Neoplasm of uncertain behavior of skin  -     Tissue Specimen To Pathology, Dermatology  -     Tissue Specimen To Pathology, Dermatology  -     Tissue Specimen To Pathology, Dermatology  Shave biopsy procedure note:    Shave biopsy performed after verbal consent including risk of infection, scar, recurrence, need for additional treatment of site. Area prepped with alcohol, anesthetized with approximately 1.0cc of 1% lidocaine with epinephrine. Lesional tissue shaved with razor blade. Hemostasis achieved with application of aluminum chloride followed by hyfrecation. No complications.  Dressing applied. Wound care explained.    Shave biopsy procedure note:    Shave biopsy performed after verbal consent including risk of infection, scar, recurrence, need for additional treatment of site. Area prepped with alcohol, anesthetized with approximately 1.0cc of 1% lidocaine with epinephrine. Lesional tissue shaved with razor blade. Hemostasis achieved with application of aluminum chloride followed by hyfrecation. No complications. Dressing applied. Wound care explained.    Shave biopsy procedure note:    Shave biopsy performed after verbal consent including risk of infection, scar, recurrence, need for additional treatment of site. Area prepped with alcohol, anesthetized with approximately 1.0cc of 1% lidocaine with epinephrine. Lesional tissue shaved with razor blade. Hemostasis achieved with application of aluminum chloride followed by hyfrecation. No complications. Dressing applied. Wound care explained.    If +, Mohs    Personal history of malignant neoplasm of skin  Continue regular skin checks           Follow up for pending Pathology.

## 2019-06-10 ENCOUNTER — TELEPHONE (OUTPATIENT)
Dept: DERMATOLOGY | Facility: CLINIC | Age: 84
End: 2019-06-10

## 2019-06-10 NOTE — TELEPHONE ENCOUNTER
spoke to daughter gave BX results and she will call us back to make consult appt for 2 skin cancers per Dr. Seaman

## 2019-06-10 NOTE — TELEPHONE ENCOUNTER
----- Message from Ranjana Seaman MD sent at 6/10/2019 12:49 PM CDT -----  Skin, left frontal scalp, shave biopsy:  - INVASIVE SQUAMOUS CELL CARCINOMA, MODERATELY DIFFERENTIATED.  - THE TUMOR EXTENDS TO THE DEEP AND LATERAL BIOPSY MARGINS.    Skin, right nasal sidewall (adjacent to scar), shave biopsy:  - BASAL CELL CARCINOMA WITH NODULAR GROWTH PATTERN.  - THE TUMOR EXTENDS TO THE DEEP AND LATERAL BIOPSY MARGINS.    Also, I biopsied a spot on his L temple that did not show skin cancer but it may need a deeper biopsy if there is any lesion remaining when he comes in to see Dr Reyes    He has seen Dr ZAZUETA In the past (2018)  Thanks  HUONG

## 2019-06-11 ENCOUNTER — TELEPHONE (OUTPATIENT)
Dept: DERMATOLOGY | Facility: CLINIC | Age: 84
End: 2019-06-11

## 2019-06-20 ENCOUNTER — TELEPHONE (OUTPATIENT)
Dept: DERMATOLOGY | Facility: CLINIC | Age: 84
End: 2019-06-20

## 2019-06-20 PROBLEM — M79.604 RIGHT LEG PAIN: Status: ACTIVE | Noted: 2019-06-20

## 2019-06-20 PROBLEM — C79.51 METASTASIS TO BONE: Status: ACTIVE | Noted: 2019-06-20

## 2019-06-20 PROBLEM — D69.6 THROMBOCYTOPENIA: Status: ACTIVE | Noted: 2019-06-20

## 2019-06-20 PROBLEM — C79.89 PROSTATE CANCER METASTATIC TO PELVIS: Status: ACTIVE | Noted: 2019-06-20

## 2019-06-20 PROBLEM — I82.452 ACUTE DEEP VEIN THROMBOSIS (DVT) OF LEFT PERONEAL VEIN: Status: ACTIVE | Noted: 2019-06-20

## 2019-06-20 PROBLEM — C61 PROSTATE CANCER METASTATIC TO PELVIS: Status: ACTIVE | Noted: 2019-06-20

## 2019-06-20 PROBLEM — I82.451 ACUTE DEEP VEIN THROMBOSIS (DVT) OF RIGHT PERONEAL VEIN: Status: ACTIVE | Noted: 2019-06-20

## 2019-06-21 ENCOUNTER — TELEPHONE (OUTPATIENT)
Dept: ORTHOPEDICS | Facility: CLINIC | Age: 84
End: 2019-06-21

## 2019-06-21 NOTE — TELEPHONE ENCOUNTER
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

## 2019-06-23 PROBLEM — D69.6 THROMBOCYTOPENIA: Status: RESOLVED | Noted: 2019-06-20 | Resolved: 2019-06-23

## 2019-06-24 PROBLEM — R63.0 ANOREXIA: Status: RESOLVED | Noted: 2018-11-25 | Resolved: 2019-06-24

## 2019-06-25 PROBLEM — R63.4 WEIGHT LOSS, UNINTENTIONAL: Status: ACTIVE | Noted: 2019-06-25

## 2023-02-08 NOTE — PLAN OF CARE
No current active infection  Using nasal steroids prn.    Pt tolerated procedure well . No pain or nausea  Tolerates fluids well. Ambulates.

## (undated) DEVICE — CORD BIPOLAR 12 FOOT

## (undated) DEVICE — SUT PROLENE TF 5-0 24IN

## (undated) DEVICE — SPONGE GAUZE 16PLY 4X4

## (undated) DEVICE — SEE MEDLINE ITEM 152487

## (undated) DEVICE — NDL HYPO A BEVEL 30X1/2

## (undated) DEVICE — CUP MEDICINE STERILE 2OZ

## (undated) DEVICE — DRESSING XEROFORM FOIL PK 1X8

## (undated) DEVICE — SEE MEDLINE ITEM 154981

## (undated) DEVICE — SEE MEDLINE ITEM 152622

## (undated) DEVICE — MARKER SKIN STND TIP BLUE BARR

## (undated) DEVICE — SUT 5/0 18IN PLAIN GUT PS

## (undated) DEVICE — SUT VICRYL 4-0 RB1 27IN UD

## (undated) DEVICE — SEE MEDLINE ITEM 157125

## (undated) DEVICE — BLADE SURG CARBON STEEL SZ11

## (undated) DEVICE — SUT PROLENE 4-0 PS2 18 BLUE

## (undated) DEVICE — SEE L#120831

## (undated) DEVICE — LABEL FOR UTILITY MARKER

## (undated) DEVICE — GLOVE SURGICAL LATEX SZ 7

## (undated) DEVICE — FORCEP STRAIGHT DISP

## (undated) DEVICE — SUT PROLENE 3-0 PS-2 BL 18IN